# Patient Record
Sex: FEMALE | Race: OTHER | Employment: OTHER | URBAN - METROPOLITAN AREA
[De-identification: names, ages, dates, MRNs, and addresses within clinical notes are randomized per-mention and may not be internally consistent; named-entity substitution may affect disease eponyms.]

---

## 2017-01-25 ENCOUNTER — OFFICE VISIT (OUTPATIENT)
Dept: PRIMARY CARE CLINIC | Age: 72
End: 2017-01-25

## 2017-01-25 VITALS
RESPIRATION RATE: 14 BRPM | BODY MASS INDEX: 24.41 KG/M2 | DIASTOLIC BLOOD PRESSURE: 76 MMHG | WEIGHT: 143 LBS | SYSTOLIC BLOOD PRESSURE: 120 MMHG | OXYGEN SATURATION: 95 % | HEART RATE: 95 BPM | HEIGHT: 64 IN | TEMPERATURE: 98.1 F

## 2017-01-25 DIAGNOSIS — J02.9 SORE THROAT: Primary | ICD-10-CM

## 2017-01-25 DIAGNOSIS — E55.9 VITAMIN D DEFICIENCY: ICD-10-CM

## 2017-01-25 DIAGNOSIS — E03.4 HYPOTHYROIDISM DUE TO ACQUIRED ATROPHY OF THYROID: ICD-10-CM

## 2017-01-25 DIAGNOSIS — E78.2 MIXED HYPERLIPIDEMIA: ICD-10-CM

## 2017-01-25 DIAGNOSIS — J30.2 SEASONAL ALLERGIC RHINITIS, UNSPECIFIED ALLERGIC RHINITIS TRIGGER: ICD-10-CM

## 2017-01-25 DIAGNOSIS — J06.9 ACUTE UPPER RESPIRATORY INFECTION: ICD-10-CM

## 2017-01-25 LAB
ALBUMIN SERPL-MCNC: 4.8 G/DL (ref 3.9–4.9)
ALP BLD-CCNC: 71 U/L (ref 40–130)
ALT SERPL-CCNC: 14 U/L (ref 0–33)
ANION GAP SERPL CALCULATED.3IONS-SCNC: 16 MEQ/L (ref 7–13)
AST SERPL-CCNC: 19 U/L (ref 0–35)
BILIRUB SERPL-MCNC: 0.3 MG/DL (ref 0–1.2)
BUN BLDV-MCNC: 19 MG/DL (ref 8–23)
CALCIUM SERPL-MCNC: 10.4 MG/DL (ref 8.6–10.2)
CHLORIDE BLD-SCNC: 100 MEQ/L (ref 98–107)
CHOLESTEROL, TOTAL: 229 MG/DL (ref 0–199)
CO2: 25 MEQ/L (ref 22–29)
CREAT SERPL-MCNC: 0.68 MG/DL (ref 0.5–0.9)
GFR AFRICAN AMERICAN: >60
GFR NON-AFRICAN AMERICAN: >60
GLOBULIN: 3 G/DL (ref 2.3–3.5)
GLUCOSE BLD-MCNC: 106 MG/DL (ref 74–109)
HDLC SERPL-MCNC: 59 MG/DL (ref 40–59)
LDL CHOLESTEROL CALCULATED: 123 MG/DL (ref 0–129)
POTASSIUM SERPL-SCNC: 4.4 MEQ/L (ref 3.5–5.1)
S PYO AG THROAT QL: NORMAL
SODIUM BLD-SCNC: 141 MEQ/L (ref 132–144)
T4 FREE: 1.39 NG/DL (ref 0.93–1.7)
TOTAL PROTEIN: 7.8 G/DL (ref 6.4–8.1)
TRIGL SERPL-MCNC: 236 MG/DL (ref 0–200)
TSH SERPL DL<=0.05 MIU/L-ACNC: 0.5 UIU/ML (ref 0.27–4.2)
VITAMIN D 25-HYDROXY: 29.4 NG/ML (ref 30–100)

## 2017-01-25 PROCEDURE — 99213 OFFICE O/P EST LOW 20 MIN: CPT | Performed by: FAMILY MEDICINE

## 2017-01-25 PROCEDURE — 87880 STREP A ASSAY W/OPTIC: CPT | Performed by: FAMILY MEDICINE

## 2017-01-25 RX ORDER — TRIAMCINOLONE ACETONIDE 40 MG/ML
80 INJECTION, SUSPENSION INTRA-ARTICULAR; INTRAMUSCULAR ONCE
Status: COMPLETED | OUTPATIENT
Start: 2017-01-25 | End: 2017-01-25

## 2017-01-25 RX ORDER — AZITHROMYCIN 250 MG/1
TABLET, FILM COATED ORAL
Qty: 6 TABLET | Refills: 1 | Status: SHIPPED | OUTPATIENT
Start: 2017-01-25 | End: 2017-07-22

## 2017-01-25 RX ADMIN — TRIAMCINOLONE ACETONIDE 80 MG: 40 INJECTION, SUSPENSION INTRA-ARTICULAR; INTRAMUSCULAR at 10:13

## 2017-01-25 ASSESSMENT — ENCOUNTER SYMPTOMS
SINUS PRESSURE: 1
SHORTNESS OF BREATH: 1
COUGH: 1
SORE THROAT: 1
RHINORRHEA: 1

## 2017-01-26 DIAGNOSIS — E03.9 HYPOTHYROIDISM: ICD-10-CM

## 2017-01-26 DIAGNOSIS — J30.9 ALLERGIC RHINITIS: ICD-10-CM

## 2017-01-26 RX ORDER — LEVOTHYROXINE SODIUM 0.12 MG/1
TABLET ORAL
Qty: 90 TABLET | Refills: 0 | Status: SHIPPED | OUTPATIENT
Start: 2017-01-26 | End: 2017-04-24 | Stop reason: SDUPTHER

## 2017-01-26 RX ORDER — MONTELUKAST SODIUM 10 MG/1
TABLET ORAL
Qty: 90 TABLET | Refills: 0 | Status: SHIPPED | OUTPATIENT
Start: 2017-01-26 | End: 2017-04-24 | Stop reason: SDUPTHER

## 2017-03-31 ENCOUNTER — TELEPHONE (OUTPATIENT)
Dept: PRIMARY CARE CLINIC | Age: 72
End: 2017-03-31

## 2017-04-24 DIAGNOSIS — J30.9 ALLERGIC RHINITIS: ICD-10-CM

## 2017-04-24 DIAGNOSIS — E03.9 HYPOTHYROIDISM: ICD-10-CM

## 2017-04-24 RX ORDER — LEVOTHYROXINE SODIUM 0.12 MG/1
TABLET ORAL
Qty: 90 TABLET | Refills: 1 | Status: SHIPPED | OUTPATIENT
Start: 2017-04-24 | End: 2017-10-24 | Stop reason: SDUPTHER

## 2017-04-24 RX ORDER — MONTELUKAST SODIUM 10 MG/1
TABLET ORAL
Qty: 90 TABLET | Refills: 1 | Status: SHIPPED | OUTPATIENT
Start: 2017-04-24 | End: 2017-10-24 | Stop reason: SDUPTHER

## 2017-07-21 ENCOUNTER — TELEPHONE (OUTPATIENT)
Dept: PRIMARY CARE CLINIC | Age: 72
End: 2017-07-21

## 2017-07-22 RX ORDER — ATORVASTATIN CALCIUM 40 MG/1
40 TABLET, FILM COATED ORAL DAILY
Qty: 90 TABLET | Refills: 1 | Status: SHIPPED | OUTPATIENT
Start: 2017-07-22 | End: 2017-12-26 | Stop reason: SDUPTHER

## 2017-07-27 RX ORDER — MELOXICAM 7.5 MG/1
TABLET ORAL
Qty: 90 TABLET | Refills: 1 | Status: SHIPPED | OUTPATIENT
Start: 2017-07-27 | End: 2018-06-22 | Stop reason: SDUPTHER

## 2017-08-02 ENCOUNTER — TELEPHONE (OUTPATIENT)
Dept: PRIMARY CARE CLINIC | Age: 72
End: 2017-08-02

## 2017-08-02 DIAGNOSIS — Z00.00 ANNUAL PHYSICAL EXAM: Primary | ICD-10-CM

## 2017-08-02 DIAGNOSIS — E55.9 VITAMIN D DEFICIENCY: ICD-10-CM

## 2017-09-13 DIAGNOSIS — Z00.00 ANNUAL PHYSICAL EXAM: ICD-10-CM

## 2017-09-13 DIAGNOSIS — E55.9 VITAMIN D DEFICIENCY: ICD-10-CM

## 2017-09-13 LAB
ALBUMIN SERPL-MCNC: 4.6 G/DL (ref 3.9–4.9)
ALP BLD-CCNC: 74 U/L (ref 40–130)
ALT SERPL-CCNC: 27 U/L (ref 0–33)
ANION GAP SERPL CALCULATED.3IONS-SCNC: 18 MEQ/L (ref 7–13)
AST SERPL-CCNC: 31 U/L (ref 0–35)
BASOPHILS ABSOLUTE: 0.1 K/UL (ref 0–0.2)
BASOPHILS RELATIVE PERCENT: 0.9 %
BILIRUB SERPL-MCNC: 0.5 MG/DL (ref 0–1.2)
BUN BLDV-MCNC: 23 MG/DL (ref 8–23)
CALCIUM SERPL-MCNC: 10.1 MG/DL (ref 8.6–10.2)
CHLORIDE BLD-SCNC: 99 MEQ/L (ref 98–107)
CHOLESTEROL, TOTAL: 194 MG/DL (ref 0–199)
CO2: 26 MEQ/L (ref 22–29)
CREAT SERPL-MCNC: 0.89 MG/DL (ref 0.5–0.9)
EOSINOPHILS ABSOLUTE: 0.5 K/UL (ref 0–0.7)
EOSINOPHILS RELATIVE PERCENT: 5.1 %
GFR AFRICAN AMERICAN: >60
GFR NON-AFRICAN AMERICAN: >60
GLOBULIN: 3.4 G/DL (ref 2.3–3.5)
GLUCOSE BLD-MCNC: 96 MG/DL (ref 74–109)
HCT VFR BLD CALC: 37.4 % (ref 37–47)
HDLC SERPL-MCNC: 62 MG/DL (ref 40–59)
HEMOGLOBIN: 12 G/DL (ref 12–16)
LDL CHOLESTEROL CALCULATED: 93 MG/DL (ref 0–129)
LYMPHOCYTES ABSOLUTE: 2.1 K/UL (ref 1–4.8)
LYMPHOCYTES RELATIVE PERCENT: 22.1 %
MCH RBC QN AUTO: 27.4 PG (ref 27–31.3)
MCHC RBC AUTO-ENTMCNC: 32.1 % (ref 33–37)
MCV RBC AUTO: 85.5 FL (ref 82–100)
MONOCYTES ABSOLUTE: 0.9 K/UL (ref 0.2–0.8)
MONOCYTES RELATIVE PERCENT: 9.3 %
NEUTROPHILS ABSOLUTE: 6 K/UL (ref 1.4–6.5)
NEUTROPHILS RELATIVE PERCENT: 62.6 %
PDW BLD-RTO: 13.5 % (ref 11.5–14.5)
PLATELET # BLD: 279 K/UL (ref 130–400)
POTASSIUM SERPL-SCNC: 4.7 MEQ/L (ref 3.5–5.1)
RBC # BLD: 4.37 M/UL (ref 4.2–5.4)
SODIUM BLD-SCNC: 143 MEQ/L (ref 132–144)
T4 FREE: 1.33 NG/DL (ref 0.93–1.7)
TOTAL PROTEIN: 8 G/DL (ref 6.4–8.1)
TRIGL SERPL-MCNC: 196 MG/DL (ref 0–200)
TSH SERPL DL<=0.05 MIU/L-ACNC: 0.32 UIU/ML (ref 0.27–4.2)
VITAMIN D 25-HYDROXY: 37.6 NG/ML (ref 30–100)
WBC # BLD: 9.5 K/UL (ref 4.8–10.8)

## 2017-10-24 ENCOUNTER — OFFICE VISIT (OUTPATIENT)
Dept: PRIMARY CARE CLINIC | Age: 72
End: 2017-10-24

## 2017-10-24 VITALS
TEMPERATURE: 97.9 F | BODY MASS INDEX: 26.4 KG/M2 | DIASTOLIC BLOOD PRESSURE: 70 MMHG | RESPIRATION RATE: 14 BRPM | HEART RATE: 60 BPM | WEIGHT: 149 LBS | SYSTOLIC BLOOD PRESSURE: 120 MMHG | HEIGHT: 63 IN

## 2017-10-24 DIAGNOSIS — E03.9 ACQUIRED HYPOTHYROIDISM: Primary | ICD-10-CM

## 2017-10-24 DIAGNOSIS — J30.2 SEASONAL ALLERGIC RHINITIS, UNSPECIFIED CHRONICITY, UNSPECIFIED TRIGGER: ICD-10-CM

## 2017-10-24 DIAGNOSIS — Z23 NEED FOR PNEUMOCOCCAL VACCINATION: ICD-10-CM

## 2017-10-24 DIAGNOSIS — Z23 NEED FOR INFLUENZA VACCINATION: ICD-10-CM

## 2017-10-24 PROCEDURE — 90662 IIV NO PRSV INCREASED AG IM: CPT | Performed by: FAMILY MEDICINE

## 2017-10-24 PROCEDURE — 90670 PCV13 VACCINE IM: CPT | Performed by: FAMILY MEDICINE

## 2017-10-24 PROCEDURE — G0008 ADMIN INFLUENZA VIRUS VAC: HCPCS | Performed by: FAMILY MEDICINE

## 2017-10-24 PROCEDURE — G0009 ADMIN PNEUMOCOCCAL VACCINE: HCPCS | Performed by: FAMILY MEDICINE

## 2017-10-24 PROCEDURE — 99213 OFFICE O/P EST LOW 20 MIN: CPT | Performed by: FAMILY MEDICINE

## 2017-10-24 RX ORDER — TRIAMCINOLONE ACETONIDE 40 MG/ML
80 INJECTION, SUSPENSION INTRA-ARTICULAR; INTRAMUSCULAR ONCE
Status: COMPLETED | OUTPATIENT
Start: 2017-10-24 | End: 2017-10-24

## 2017-10-24 RX ORDER — MONTELUKAST SODIUM 10 MG/1
TABLET ORAL
Qty: 90 TABLET | Refills: 1 | Status: SHIPPED | OUTPATIENT
Start: 2017-10-24 | End: 2018-04-22 | Stop reason: SDUPTHER

## 2017-10-24 RX ORDER — LEVOTHYROXINE SODIUM 0.12 MG/1
TABLET ORAL
Qty: 90 TABLET | Refills: 1 | Status: SHIPPED | OUTPATIENT
Start: 2017-10-24 | End: 2018-04-22 | Stop reason: SDUPTHER

## 2017-10-24 RX ADMIN — TRIAMCINOLONE ACETONIDE 80 MG: 40 INJECTION, SUSPENSION INTRA-ARTICULAR; INTRAMUSCULAR at 12:21

## 2017-10-24 ASSESSMENT — PATIENT HEALTH QUESTIONNAIRE - PHQ9
SUM OF ALL RESPONSES TO PHQ9 QUESTIONS 1 & 2: 0
2. FEELING DOWN, DEPRESSED OR HOPELESS: 0
SUM OF ALL RESPONSES TO PHQ QUESTIONS 1-9: 0
1. LITTLE INTEREST OR PLEASURE IN DOING THINGS: 0

## 2017-10-24 ASSESSMENT — ENCOUNTER SYMPTOMS
EYE REDNESS: 0
PHOTOPHOBIA: 0
EYES NEGATIVE: 1
BACK PAIN: 0
DIARRHEA: 0
WHEEZING: 0
RESPIRATORY NEGATIVE: 1
SHORTNESS OF BREATH: 0
GASTROINTESTINAL NEGATIVE: 1
ABDOMINAL PAIN: 0
EYE ITCHING: 0
CONSTIPATION: 0

## 2017-10-24 NOTE — PROGRESS NOTES
Subjective:      Patient ID: Xochitl Brian is a 67 y.o. female who presents today for:  Chief Complaint   Patient presents with    Allergies     pt is requesting an allergy injection last injection 1/2017    Hypothyroidism     follow up pt denies fatigue,weight gain or loss,hair loss or dry skin       HPI     Patient is here for a follow up on her thyroids. Patient denies any chest pain, palpitations, shortness of breath, dizziness, lightheadedness, weakness. Past Medical History:   Diagnosis Date    Allergic rhinitis     Ankle dislocation     (L) FX    Hypercholesterolemia     Hypothyroidism      Past Surgical History:   Procedure Laterality Date    HYSTERECTOMY      PARTIAL    THYROID SURGERY      PARTIAL REMOVAL     Family History   Problem Relation Age of Onset    High Blood Pressure Mother      Social History     Social History    Marital status:      Spouse name: N/A    Number of children: N/A    Years of education: N/A     Occupational History    Not on file. Social History Main Topics    Smoking status: Never Smoker    Smokeless tobacco: Never Used    Alcohol use No    Drug use: No    Sexual activity: Not on file     Other Topics Concern    Not on file     Social History Narrative    No narrative on file     Allergies:  Review of patient's allergies indicates no known allergies. Review of Systems   Constitutional: Negative. Negative for activity change, appetite change and fatigue. HENT: Negative. Eyes: Negative. Negative for photophobia, redness and itching. Respiratory: Negative. Negative for shortness of breath and wheezing. Cardiovascular: Negative. Gastrointestinal: Negative. Negative for abdominal pain, constipation and diarrhea. Genitourinary: Negative. Negative for hematuria, pelvic pain and urgency. Musculoskeletal: Negative. Negative for back pain. Skin: Negative. Neurological: Negative. Psychiatric/Behavioral: Negative.   Negative for agitation and behavioral problems. The patient is not nervous/anxious. Objective:   /70 (Site: Left Arm, Position: Sitting, Cuff Size: Medium Adult)   Pulse 60   Temp 97.9 °F (36.6 °C) (Tympanic)   Resp 14   Ht 5' 3\" (1.6 m)   Wt 149 lb (67.6 kg)   BMI 26.39 kg/m²     Physical Exam   Constitutional: She is oriented to person, place, and time. She appears well-developed and well-nourished. HENT:   Head: Normocephalic and atraumatic. Nose: Mucosal edema and rhinorrhea present. No sinus tenderness. No epistaxis. Eyes: Conjunctivae and EOM are normal. Pupils are equal, round, and reactive to light. Neck: Normal range of motion. Neck supple. No thyromegaly present. Cardiovascular: Normal rate, regular rhythm and normal heart sounds. No murmur heard. Pulmonary/Chest: Effort normal and breath sounds normal. She has no wheezes. She exhibits no tenderness. Abdominal: Soft. Bowel sounds are normal. There is no tenderness. Musculoskeletal: Normal range of motion. She exhibits no edema or tenderness. Lymphadenopathy:     She has no cervical adenopathy. Neurological: She is alert and oriented to person, place, and time. She has normal reflexes. Coordination normal.   Skin: Skin is warm and dry. No rash noted. Psychiatric: She has a normal mood and affect. Thought content normal.   Nursing note and vitals reviewed. Assessment:     1. Acquired hypothyroidism  levothyroxine (SYNTHROID) 125 MCG tablet   2. Seasonal allergic rhinitis, unspecified chronicity, unspecified trigger  triamcinolone acetonide (KENALOG-40) injection 80 mg    montelukast (SINGULAIR) 10 MG tablet   3. Need for influenza vaccination  INFLUENZA, HIGH DOSE, 65 YRS +, IM, PF, PREFILL SYR, 0.5ML (FLUZONE HD)   4.  Need for pneumococcal vaccination  Pneumococcal conjugate vaccine 13-valent IM (PREVNAR 13)       Plan:      Orders Placed This Encounter   Procedures    INFLUENZA, HIGH DOSE, 65 YRS +, IM, PF, PREFILL SYR, 0.5ML (FLUZONE HD)    Pneumococcal conjugate vaccine 13-valent IM (PREVNAR 13)     Orders Placed This Encounter   Medications    triamcinolone acetonide (KENALOG-40) injection 80 mg    levothyroxine (SYNTHROID) 125 MCG tablet     Sig: TAKE 1 TABLET DAILY     Dispense:  90 tablet     Refill:  1    montelukast (SINGULAIR) 10 MG tablet     Sig: TAKE 1 TABLET DAILY     Dispense:  90 tablet     Refill:  1       Controlled Substances Monitoring:      Return in about 6 months (around 4/24/2018) for Review of Labs & Diagnostic Testing, Routine follow up. Lynette SIMONS ([facility]), am scribing for and in the presence of Shelbie Rodas DO. Electronically signed by :  Joshua Marshall, [address] [place] [address] ([address] [place] [address])      I, Shelbie Rodas DO, personally performed the services described in this documentation, as scribed by Joshua Marshall in my presence, and it is both accurate and complete.  Electronically signed by: Shelbie Rodas DO    10/24/17 10:28 PM    Shelbie Rodas DO

## 2017-11-21 RX ORDER — NITROGLYCERIN 0.4 MG/1
0.4 TABLET SUBLINGUAL EVERY 5 MIN PRN
Qty: 25 TABLET | Refills: 3 | Status: SHIPPED | OUTPATIENT
Start: 2017-11-21

## 2017-12-26 RX ORDER — ATORVASTATIN CALCIUM 40 MG/1
TABLET, FILM COATED ORAL
Qty: 90 TABLET | Refills: 1 | Status: SHIPPED | OUTPATIENT
Start: 2017-12-26

## 2018-04-22 DIAGNOSIS — E03.9 ACQUIRED HYPOTHYROIDISM: ICD-10-CM

## 2018-04-22 DIAGNOSIS — J30.2 SEASONAL ALLERGIC RHINITIS, UNSPECIFIED CHRONICITY, UNSPECIFIED TRIGGER: ICD-10-CM

## 2018-04-22 RX ORDER — MONTELUKAST SODIUM 10 MG/1
TABLET ORAL
Qty: 90 TABLET | Refills: 1 | Status: SHIPPED | OUTPATIENT
Start: 2018-04-22 | End: 2018-10-19 | Stop reason: SDUPTHER

## 2018-04-22 RX ORDER — LEVOTHYROXINE SODIUM 0.12 MG/1
TABLET ORAL
Qty: 90 TABLET | Refills: 1 | Status: SHIPPED | OUTPATIENT
Start: 2018-04-22 | End: 2019-01-17 | Stop reason: SDUPTHER

## 2018-06-20 RX ORDER — MELOXICAM 7.5 MG/1
TABLET ORAL
Qty: 90 TABLET | Refills: 1 | OUTPATIENT
Start: 2018-06-20

## 2018-06-22 ENCOUNTER — OFFICE VISIT (OUTPATIENT)
Dept: PRIMARY CARE CLINIC | Age: 73
End: 2018-06-22
Payer: MEDICARE

## 2018-06-22 VITALS
HEIGHT: 63 IN | HEART RATE: 75 BPM | SYSTOLIC BLOOD PRESSURE: 126 MMHG | WEIGHT: 149.3 LBS | DIASTOLIC BLOOD PRESSURE: 72 MMHG | OXYGEN SATURATION: 96 % | BODY MASS INDEX: 26.45 KG/M2 | TEMPERATURE: 98.1 F

## 2018-06-22 DIAGNOSIS — D51.9 ANEMIA DUE TO VITAMIN B12 DEFICIENCY, UNSPECIFIED B12 DEFICIENCY TYPE: ICD-10-CM

## 2018-06-22 DIAGNOSIS — E78.5 DYSLIPIDEMIA: ICD-10-CM

## 2018-06-22 DIAGNOSIS — J30.9 ACUTE ALLERGIC RHINITIS, UNSPECIFIED SEASONALITY, UNSPECIFIED TRIGGER: ICD-10-CM

## 2018-06-22 DIAGNOSIS — E55.9 VITAMIN D DEFICIENCY: ICD-10-CM

## 2018-06-22 DIAGNOSIS — Z00.00 PREVENTATIVE HEALTH CARE: ICD-10-CM

## 2018-06-22 DIAGNOSIS — E03.4 HYPOTHYROIDISM DUE TO ACQUIRED ATROPHY OF THYROID: Primary | ICD-10-CM

## 2018-06-22 DIAGNOSIS — E03.4 HYPOTHYROIDISM DUE TO ACQUIRED ATROPHY OF THYROID: ICD-10-CM

## 2018-06-22 LAB
ALBUMIN SERPL-MCNC: 4.8 G/DL (ref 3.9–4.9)
ALP BLD-CCNC: 80 U/L (ref 40–130)
ALT SERPL-CCNC: 22 U/L (ref 0–33)
ANION GAP SERPL CALCULATED.3IONS-SCNC: 17 MEQ/L (ref 7–13)
AST SERPL-CCNC: 40 U/L (ref 0–35)
BASOPHILS ABSOLUTE: 0.1 K/UL (ref 0–0.2)
BASOPHILS RELATIVE PERCENT: 0.6 %
BILIRUB SERPL-MCNC: 0.5 MG/DL (ref 0–1.2)
BUN BLDV-MCNC: 26 MG/DL (ref 8–23)
CALCIUM SERPL-MCNC: 10.2 MG/DL (ref 8.6–10.2)
CHLORIDE BLD-SCNC: 100 MEQ/L (ref 98–107)
CHOLESTEROL, TOTAL: 206 MG/DL (ref 0–199)
CO2: 23 MEQ/L (ref 22–29)
CREAT SERPL-MCNC: 0.91 MG/DL (ref 0.5–0.9)
EOSINOPHILS ABSOLUTE: 0.6 K/UL (ref 0–0.7)
EOSINOPHILS RELATIVE PERCENT: 6.6 %
GFR AFRICAN AMERICAN: >60
GFR NON-AFRICAN AMERICAN: >60
GLOBULIN: 3.8 G/DL (ref 2.3–3.5)
GLUCOSE BLD-MCNC: 95 MG/DL (ref 74–109)
HCT VFR BLD CALC: 38.2 % (ref 37–47)
HDLC SERPL-MCNC: 53 MG/DL (ref 40–59)
HEMOGLOBIN: 12.4 G/DL (ref 12–16)
LDL CHOLESTEROL CALCULATED: 126 MG/DL (ref 0–129)
LYMPHOCYTES ABSOLUTE: 2.5 K/UL (ref 1–4.8)
LYMPHOCYTES RELATIVE PERCENT: 28.7 %
MCH RBC QN AUTO: 28.6 PG (ref 27–31.3)
MCHC RBC AUTO-ENTMCNC: 32.5 % (ref 33–37)
MCV RBC AUTO: 87.9 FL (ref 82–100)
MONOCYTES ABSOLUTE: 0.8 K/UL (ref 0.2–0.8)
MONOCYTES RELATIVE PERCENT: 9.1 %
NEUTROPHILS ABSOLUTE: 4.8 K/UL (ref 1.4–6.5)
NEUTROPHILS RELATIVE PERCENT: 55 %
PDW BLD-RTO: 13.6 % (ref 11.5–14.5)
PLATELET # BLD: 297 K/UL (ref 130–400)
POTASSIUM SERPL-SCNC: 4.5 MEQ/L (ref 3.5–5.1)
RBC # BLD: 4.35 M/UL (ref 4.2–5.4)
SODIUM BLD-SCNC: 140 MEQ/L (ref 132–144)
TOTAL PROTEIN: 8.6 G/DL (ref 6.4–8.1)
TRIGL SERPL-MCNC: 134 MG/DL (ref 0–200)
TSH SERPL DL<=0.05 MIU/L-ACNC: 0.3 UIU/ML (ref 0.27–4.2)
VITAMIN D 25-HYDROXY: 39.3 NG/ML (ref 30–100)
WBC # BLD: 8.7 K/UL (ref 4.8–10.8)

## 2018-06-22 PROCEDURE — 99213 OFFICE O/P EST LOW 20 MIN: CPT | Performed by: FAMILY MEDICINE

## 2018-06-22 PROCEDURE — 96372 THER/PROPH/DIAG INJ SC/IM: CPT | Performed by: FAMILY MEDICINE

## 2018-06-22 RX ORDER — TRIAMCINOLONE ACETONIDE 40 MG/ML
80 INJECTION, SUSPENSION INTRA-ARTICULAR; INTRAMUSCULAR ONCE
Status: COMPLETED | OUTPATIENT
Start: 2018-06-22 | End: 2018-06-22

## 2018-06-22 RX ORDER — MELOXICAM 7.5 MG/1
TABLET ORAL
Qty: 90 TABLET | Refills: 1 | Status: SHIPPED | OUTPATIENT
Start: 2018-06-22

## 2018-06-22 RX ADMIN — TRIAMCINOLONE ACETONIDE 80 MG: 40 INJECTION, SUSPENSION INTRA-ARTICULAR; INTRAMUSCULAR at 11:08

## 2018-06-22 ASSESSMENT — ENCOUNTER SYMPTOMS
RESPIRATORY NEGATIVE: 1
GASTROINTESTINAL NEGATIVE: 1
SHORTNESS OF BREATH: 0
WHEEZING: 0
EYE ITCHING: 0
EYE REDNESS: 0
BACK PAIN: 0
ABDOMINAL PAIN: 0
EYES NEGATIVE: 1
CONSTIPATION: 0
PHOTOPHOBIA: 0
DIARRHEA: 0

## 2018-06-26 DIAGNOSIS — Z00.00 PREVENTATIVE HEALTH CARE: ICD-10-CM

## 2018-06-26 LAB
CONTROL: POSITIVE
HEMOCCULT STL QL: NEGATIVE

## 2018-06-26 PROCEDURE — 82274 ASSAY TEST FOR BLOOD FECAL: CPT | Performed by: FAMILY MEDICINE

## 2018-10-19 DIAGNOSIS — J30.2 SEASONAL ALLERGIC RHINITIS: ICD-10-CM

## 2018-10-19 RX ORDER — MONTELUKAST SODIUM 10 MG/1
TABLET ORAL
Qty: 90 TABLET | Refills: 1 | Status: SHIPPED | OUTPATIENT
Start: 2018-10-19

## 2019-01-11 DIAGNOSIS — E03.9 ACQUIRED HYPOTHYROIDISM: ICD-10-CM

## 2019-01-11 RX ORDER — LEVOTHYROXINE SODIUM 0.12 MG/1
TABLET ORAL
Qty: 90 TABLET | Refills: 1 | OUTPATIENT
Start: 2019-01-11

## 2019-01-17 ENCOUNTER — OFFICE VISIT (OUTPATIENT)
Dept: PRIMARY CARE CLINIC | Age: 74
End: 2019-01-17
Payer: MEDICARE

## 2019-01-17 VITALS
BODY MASS INDEX: 25.23 KG/M2 | HEIGHT: 64 IN | DIASTOLIC BLOOD PRESSURE: 64 MMHG | HEART RATE: 62 BPM | TEMPERATURE: 97 F | OXYGEN SATURATION: 98 % | SYSTOLIC BLOOD PRESSURE: 112 MMHG | WEIGHT: 147.8 LBS

## 2019-01-17 DIAGNOSIS — E03.9 ACQUIRED HYPOTHYROIDISM: Primary | ICD-10-CM

## 2019-01-17 DIAGNOSIS — J30.9 ALLERGIC RHINITIS, UNSPECIFIED SEASONALITY, UNSPECIFIED TRIGGER: ICD-10-CM

## 2019-01-17 DIAGNOSIS — E78.00 HYPERCHOLESTEROLEMIA: ICD-10-CM

## 2019-01-17 DIAGNOSIS — E03.9 ACQUIRED HYPOTHYROIDISM: ICD-10-CM

## 2019-01-17 DIAGNOSIS — Z23 NEED FOR VACCINATION AGAINST STREPTOCOCCUS PNEUMONIAE: ICD-10-CM

## 2019-01-17 LAB
ALBUMIN SERPL-MCNC: 4.6 G/DL (ref 3.9–4.9)
ALP BLD-CCNC: 85 U/L (ref 40–130)
ALT SERPL-CCNC: 21 U/L (ref 0–33)
ANION GAP SERPL CALCULATED.3IONS-SCNC: 15 MEQ/L (ref 7–13)
AST SERPL-CCNC: 31 U/L (ref 0–35)
BILIRUB SERPL-MCNC: 0.4 MG/DL (ref 0–1.2)
BUN BLDV-MCNC: 21 MG/DL (ref 8–23)
CALCIUM SERPL-MCNC: 10.5 MG/DL (ref 8.6–10.2)
CHLORIDE BLD-SCNC: 101 MEQ/L (ref 98–107)
CHOLESTEROL, TOTAL: 210 MG/DL (ref 0–199)
CO2: 26 MEQ/L (ref 22–29)
CREAT SERPL-MCNC: 0.99 MG/DL (ref 0.5–0.9)
GFR AFRICAN AMERICAN: >60
GFR NON-AFRICAN AMERICAN: 54.7
GLOBULIN: 3.5 G/DL (ref 2.3–3.5)
GLUCOSE BLD-MCNC: 92 MG/DL (ref 74–109)
HDLC SERPL-MCNC: 51 MG/DL (ref 40–59)
LDL CHOLESTEROL CALCULATED: 114 MG/DL (ref 0–129)
POTASSIUM SERPL-SCNC: 4.9 MEQ/L (ref 3.5–5.1)
SODIUM BLD-SCNC: 142 MEQ/L (ref 132–144)
T4 FREE: 1.31 NG/DL (ref 0.93–1.7)
TOTAL PROTEIN: 8.1 G/DL (ref 6.4–8.1)
TRIGL SERPL-MCNC: 227 MG/DL (ref 0–200)
TSH SERPL DL<=0.05 MIU/L-ACNC: 0.49 UIU/ML (ref 0.27–4.2)

## 2019-01-17 PROCEDURE — 99213 OFFICE O/P EST LOW 20 MIN: CPT | Performed by: FAMILY MEDICINE

## 2019-01-17 PROCEDURE — 90732 PPSV23 VACC 2 YRS+ SUBQ/IM: CPT | Performed by: FAMILY MEDICINE

## 2019-01-17 PROCEDURE — G0009 ADMIN PNEUMOCOCCAL VACCINE: HCPCS | Performed by: FAMILY MEDICINE

## 2019-01-17 RX ORDER — LEVOTHYROXINE SODIUM 0.12 MG/1
TABLET ORAL
Qty: 90 TABLET | Refills: 1 | Status: SHIPPED | OUTPATIENT
Start: 2019-01-17

## 2019-01-17 ASSESSMENT — PATIENT HEALTH QUESTIONNAIRE - PHQ9
SUM OF ALL RESPONSES TO PHQ9 QUESTIONS 1 & 2: 0
SUM OF ALL RESPONSES TO PHQ QUESTIONS 1-9: 0
1. LITTLE INTEREST OR PLEASURE IN DOING THINGS: 0
SUM OF ALL RESPONSES TO PHQ QUESTIONS 1-9: 0
2. FEELING DOWN, DEPRESSED OR HOPELESS: 0

## 2019-01-20 ASSESSMENT — ENCOUNTER SYMPTOMS
BACK PAIN: 0
DIARRHEA: 0
CONSTIPATION: 0
ABDOMINAL PAIN: 0
GASTROINTESTINAL NEGATIVE: 1
EYE ITCHING: 0
RESPIRATORY NEGATIVE: 1
WHEEZING: 0
EYES NEGATIVE: 1
PHOTOPHOBIA: 0
EYE REDNESS: 0
SHORTNESS OF BREATH: 0

## 2019-01-24 ENCOUNTER — TELEPHONE (OUTPATIENT)
Dept: PRIMARY CARE CLINIC | Age: 74
End: 2019-01-24

## 2019-02-20 ENCOUNTER — TELEPHONE (OUTPATIENT)
Dept: FAMILY MEDICINE CLINIC | Age: 74
End: 2019-02-20

## 2023-02-27 LAB
ALANINE AMINOTRANSFERASE (SGPT) (U/L) IN SER/PLAS: 23 U/L (ref 7–45)
ALBUMIN (G/DL) IN SER/PLAS: 4.5 G/DL (ref 3.4–5)
ALKALINE PHOSPHATASE (U/L) IN SER/PLAS: 72 U/L (ref 33–136)
ANION GAP IN SER/PLAS: 10 MMOL/L (ref 10–20)
ASPARTATE AMINOTRANSFERASE (SGOT) (U/L) IN SER/PLAS: 27 U/L (ref 9–39)
BILIRUBIN TOTAL (MG/DL) IN SER/PLAS: 0.7 MG/DL (ref 0–1.2)
CALCIUM (MG/DL) IN SER/PLAS: 10.3 MG/DL (ref 8.6–10.3)
CARBON DIOXIDE, TOTAL (MMOL/L) IN SER/PLAS: 30 MMOL/L (ref 21–32)
CHLORIDE (MMOL/L) IN SER/PLAS: 106 MMOL/L (ref 98–107)
CHOLESTEROL (MG/DL) IN SER/PLAS: 140 MG/DL (ref 0–199)
CHOLESTEROL IN HDL (MG/DL) IN SER/PLAS: 44 MG/DL
CHOLESTEROL/HDL RATIO: 3.2
CREATININE (MG/DL) IN SER/PLAS: 1.09 MG/DL (ref 0.5–1.05)
GFR FEMALE: 52 ML/MIN/1.73M2
GLUCOSE (MG/DL) IN SER/PLAS: 98 MG/DL (ref 74–99)
LDL: 66 MG/DL (ref 0–99)
POTASSIUM (MMOL/L) IN SER/PLAS: 4.7 MMOL/L (ref 3.5–5.3)
PROTEIN TOTAL: 7.8 G/DL (ref 6.4–8.2)
SODIUM (MMOL/L) IN SER/PLAS: 141 MMOL/L (ref 136–145)
THYROTROPIN (MIU/L) IN SER/PLAS BY DETECTION LIMIT <= 0.05 MIU/L: 0.06 MIU/L (ref 0.44–3.98)
THYROXINE (T4) (UG/DL) IN SER/PLAS: 13 UG/DL (ref 4.5–11.1)
THYROXINE (T4) FREE INDEX IN SER/PLAS BY CALCULATION: 4.2 (ref 1.6–4.7)
TRIGLYCERIDE (MG/DL) IN SER/PLAS: 149 MG/DL (ref 0–149)
TRIIODOTHYRONINE RESIN UPTAKE (T3RU) % IN SER/PLAS: 32 % (ref 24–41)
UREA NITROGEN (MG/DL) IN SER/PLAS: 22 MG/DL (ref 6–23)
VLDL: 30 MG/DL (ref 0–40)

## 2023-03-11 DIAGNOSIS — M25.532 LEFT WRIST PAIN: Primary | ICD-10-CM

## 2023-03-13 PROBLEM — M25.532 LEFT WRIST PAIN: Status: ACTIVE | Noted: 2023-03-13

## 2023-03-13 RX ORDER — MELOXICAM 7.5 MG/1
TABLET ORAL
Qty: 90 TABLET | Refills: 0 | Status: SHIPPED | OUTPATIENT
Start: 2023-03-13

## 2023-04-01 DIAGNOSIS — J00 ACUTE RHINITIS: ICD-10-CM

## 2023-04-03 PROBLEM — J00 ACUTE RHINITIS: Status: ACTIVE | Noted: 2023-04-03

## 2023-04-03 RX ORDER — MONTELUKAST SODIUM 10 MG/1
TABLET ORAL
Qty: 90 TABLET | Refills: 1 | Status: SHIPPED | OUTPATIENT
Start: 2023-04-03 | End: 2023-09-21 | Stop reason: SDUPTHER

## 2023-07-12 ENCOUNTER — TELEPHONE (OUTPATIENT)
Dept: PRIMARY CARE | Facility: CLINIC | Age: 78
End: 2023-07-12
Payer: MEDICARE

## 2023-07-12 NOTE — TELEPHONE ENCOUNTER
Dr. Groves Pt.    Pt stated that she is starting to get sinus infection and would like to know if Dr. Groves could call in an antibiotic for her.    Pharmacy- [facility] in [address] [address]

## 2023-08-01 ENCOUNTER — OFFICE VISIT (OUTPATIENT)
Dept: PRIMARY CARE | Facility: CLINIC | Age: 78
End: 2023-08-01
Payer: MEDICARE

## 2023-08-01 VITALS
OXYGEN SATURATION: 98 % | HEIGHT: 64 IN | SYSTOLIC BLOOD PRESSURE: 130 MMHG | DIASTOLIC BLOOD PRESSURE: 74 MMHG | TEMPERATURE: 98.1 F | WEIGHT: 140 LBS | HEART RATE: 89 BPM | BODY MASS INDEX: 23.9 KG/M2

## 2023-08-01 DIAGNOSIS — Z71.89 ACP (ADVANCE CARE PLANNING): ICD-10-CM

## 2023-08-01 DIAGNOSIS — M85.80 OSTEOPENIA, UNSPECIFIED LOCATION: ICD-10-CM

## 2023-08-01 DIAGNOSIS — E03.9 HYPOTHYROIDISM, UNSPECIFIED TYPE: ICD-10-CM

## 2023-08-01 DIAGNOSIS — J01.80 OTHER ACUTE SINUSITIS, RECURRENCE NOT SPECIFIED: ICD-10-CM

## 2023-08-01 DIAGNOSIS — Z00.00 ENCOUNTER FOR MEDICARE ANNUAL WELLNESS EXAM: Primary | ICD-10-CM

## 2023-08-01 DIAGNOSIS — Z00.00 ROUTINE GENERAL MEDICAL EXAMINATION AT HEALTH CARE FACILITY: ICD-10-CM

## 2023-08-01 PROBLEM — I25.10 CAD (CORONARY ARTERY DISEASE): Status: ACTIVE | Noted: 2023-08-01

## 2023-08-01 PROBLEM — E05.90 HYPERTHYROIDISM: Status: ACTIVE | Noted: 2023-08-01

## 2023-08-01 PROBLEM — R53.83 FATIGUE: Status: ACTIVE | Noted: 2023-08-01

## 2023-08-01 PROBLEM — E78.00 HYPERCHOLESTEROLEMIA: Status: ACTIVE | Noted: 2023-08-01

## 2023-08-01 PROBLEM — E78.5 DYSLIPIDEMIA: Status: ACTIVE | Noted: 2023-08-01

## 2023-08-01 PROBLEM — E55.9 VITAMIN D DEFICIENCY: Status: ACTIVE | Noted: 2023-08-01

## 2023-08-01 PROCEDURE — 99212 OFFICE O/P EST SF 10 MIN: CPT | Performed by: FAMILY MEDICINE

## 2023-08-01 PROCEDURE — G0439 PPPS, SUBSEQ VISIT: HCPCS | Performed by: FAMILY MEDICINE

## 2023-08-01 RX ORDER — IBANDRONATE SODIUM 150 MG/1
150 TABLET, FILM COATED ORAL
Qty: 4 TABLET | Refills: 3 | Status: SHIPPED | OUTPATIENT
Start: 2023-08-01

## 2023-08-01 RX ORDER — AMOXICILLIN AND CLAVULANATE POTASSIUM 875; 125 MG/1; MG/1
875 TABLET, FILM COATED ORAL 2 TIMES DAILY
Qty: 20 TABLET | Refills: 0 | Status: SHIPPED | OUTPATIENT
Start: 2023-08-01 | End: 2023-08-01 | Stop reason: SDUPTHER

## 2023-08-01 RX ORDER — ASPIRIN 81 MG/1
1 TABLET ORAL DAILY
COMMUNITY

## 2023-08-01 RX ORDER — AMOXICILLIN AND CLAVULANATE POTASSIUM 875; 125 MG/1; MG/1
875 TABLET, FILM COATED ORAL 2 TIMES DAILY
Qty: 20 TABLET | Refills: 0 | Status: SHIPPED | OUTPATIENT
Start: 2023-08-01 | End: 2023-08-11

## 2023-08-01 RX ORDER — EZETIMIBE 10 MG/1
1 TABLET ORAL DAILY
COMMUNITY
Start: 2022-05-09 | End: 2024-02-06

## 2023-08-01 RX ORDER — LEVOTHYROXINE SODIUM 125 UG/1
TABLET ORAL
COMMUNITY
Start: 2019-01-17 | End: 2023-12-29

## 2023-08-01 RX ORDER — ATORVASTATIN CALCIUM 80 MG/1
1 TABLET, FILM COATED ORAL NIGHTLY
COMMUNITY
Start: 2022-04-27 | End: 2024-02-06

## 2023-08-01 RX ORDER — ATORVASTATIN CALCIUM 40 MG/1
80 TABLET, FILM COATED ORAL DAILY
COMMUNITY
Start: 2017-12-26 | End: 2023-08-01 | Stop reason: DRUGHIGH

## 2023-08-01 ASSESSMENT — ENCOUNTER SYMPTOMS
SWOLLEN GLANDS: 0
SINUS COMPLAINT: 1
NECK PAIN: 0
PALPITATIONS: 0
CONFUSION: 0
FLANK PAIN: 0
FATIGUE: 0
SPEECH DIFFICULTY: 0
SINUS PRESSURE: 0
DIARRHEA: 0
ARTHRALGIAS: 0
COLOR CHANGE: 0
SLEEP DISTURBANCE: 0
SEIZURES: 0
RHINORRHEA: 0
RECTAL PAIN: 0
CHEST TIGHTNESS: 0
AGITATION: 0
TROUBLE SWALLOWING: 0
MYALGIAS: 0
FEVER: 0
COUGH: 0
DYSURIA: 0
SHORTNESS OF BREATH: 0
ADENOPATHY: 0
DECREASED CONCENTRATION: 0
SINUS PAIN: 0
CONSTIPATION: 0
BLOOD IN STOOL: 0
DIZZINESS: 0
SORE THROAT: 0
DYSPHORIC MOOD: 0
NECK STIFFNESS: 0
CHILLS: 1
EYE PAIN: 0
PHOTOPHOBIA: 0
ABDOMINAL DISTENTION: 0
POLYDIPSIA: 0
HEMATURIA: 0
STRIDOR: 0
APPETITE CHANGE: 0
HEADACHES: 0
POLYPHAGIA: 0
NERVOUS/ANXIOUS: 0
DIAPHORESIS: 0
ACTIVITY CHANGE: 0
ABDOMINAL PAIN: 0
HOARSE VOICE: 0

## 2023-08-01 ASSESSMENT — PATIENT HEALTH QUESTIONNAIRE - PHQ9
2. FEELING DOWN, DEPRESSED OR HOPELESS: NOT AT ALL
1. LITTLE INTEREST OR PLEASURE IN DOING THINGS: NOT AT ALL
SUM OF ALL RESPONSES TO PHQ9 QUESTIONS 1 AND 2: 0

## 2023-08-01 ASSESSMENT — ACTIVITIES OF DAILY LIVING (ADL)
DRESSING: INDEPENDENT
BATHING: INDEPENDENT
MANAGING_FINANCES: INDEPENDENT
GROCERY_SHOPPING: INDEPENDENT
TAKING_MEDICATION: INDEPENDENT
DOING_HOUSEWORK: INDEPENDENT

## 2023-08-01 NOTE — PATIENT INSTRUCTIONS
Follow up in 4 months    Continue current medications and therapy for chronic medical conditions.    Patient was advised importance of proper diet/nutrition in addition adequate hydration. Patient was encouraged moderate exercise program to include 30 minutes daily for 5 days of the week or 150 minutes weekly. Patient will follow-up with us as scheduled.    Medicare annual wellness completed today     Bone density reviewed with patient     Start augmenting 875-125mg BID x 10 days     Start Boniva 150mg once monthly     T4 and TSH ordered

## 2023-08-01 NOTE — PROGRESS NOTES
Subjective   Reason for Visit: David Medina is an 78 y.o. female here for a Medicare Wellness visit.     Past Medical, Surgical, and Family History reviewed and updated in chart.         Patient got stung by a bee a couple of days ago, states the area is red and feels she has a fever.     Sinus Problem  This is a new problem. The current episode started 1 to 4 weeks ago. The problem has been gradually improving since onset. The maximum temperature recorded prior to her arrival was 100.4 - 100.9 F. Her pain is at a severity of 0/10. She is experiencing no pain. Associated symptoms include chills and congestion. Pertinent negatives include no coughing, diaphoresis, ear pain, headaches, hoarse voice, neck pain, shortness of breath, sinus pressure, sneezing, sore throat or swollen glands. Treatments tried: benadryl. The treatment provided no relief.       Patient Care Team:  Jimenez Groves DO as PCP - General  Jimenez Groves DO as PCP - [employer] PCP     Review of Systems   Constitutional:  Positive for chills. Negative for activity change, appetite change, diaphoresis, fatigue and fever.   HENT:  Positive for congestion. Negative for dental problem, ear discharge, ear pain, hoarse voice, mouth sores, rhinorrhea, sinus pressure, sinus pain, sneezing, sore throat, tinnitus and trouble swallowing.    Eyes:  Negative for photophobia, pain and visual disturbance.   Respiratory:  Negative for cough, chest tightness, shortness of breath and stridor.    Cardiovascular:  Negative for chest pain and palpitations.   Gastrointestinal:  Negative for abdominal distention, abdominal pain, blood in stool, constipation, diarrhea and rectal pain.   Endocrine: Negative for cold intolerance, heat intolerance, polydipsia, polyphagia and polyuria.   Genitourinary:  Negative for dysuria, flank pain, hematuria and urgency.   Musculoskeletal:  Negative for arthralgias, gait problem, myalgias, neck pain and neck  "stiffness.   Skin:  Negative for color change and rash.   Allergic/Immunologic: Negative for environmental allergies and food allergies.   Neurological:  Negative for dizziness, seizures, syncope, speech difficulty and headaches.   Hematological:  Negative for adenopathy.   Psychiatric/Behavioral:  Negative for agitation, confusion, decreased concentration, dysphoric mood and sleep disturbance. The patient is not nervous/anxious.        Objective   Vitals:  /74   Pulse 89   Temp 36.7 °C (98.1 °F)   Ht 1.626 m (5' 4\")   Wt 63.5 kg (140 lb)   SpO2 98%   BMI 24.03 kg/m²       Physical Exam  Vitals reviewed.   Constitutional:       General: She is not in acute distress.     Appearance: Normal appearance. She is normal weight. She is not ill-appearing or diaphoretic.   HENT:      Head: Normocephalic.      Right Ear: Tympanic membrane and external ear normal.      Left Ear: Tympanic membrane and external ear normal.      Nose: Nose normal. No congestion.      Mouth/Throat:      Pharynx: No posterior oropharyngeal erythema.   Eyes:      General:         Right eye: No discharge.         Left eye: No discharge.      Extraocular Movements: Extraocular movements intact.      Conjunctiva/sclera: Conjunctivae normal.      Pupils: Pupils are equal, round, and reactive to light.   Cardiovascular:      Rate and Rhythm: Normal rate and regular rhythm.      Pulses: Normal pulses.      Heart sounds: Normal heart sounds. No murmur heard.  Pulmonary:      Effort: Pulmonary effort is normal. No respiratory distress.      Breath sounds: Normal breath sounds. No wheezing or rales.   Chest:      Chest wall: No tenderness.   Abdominal:      General: Abdomen is flat. Bowel sounds are normal. There is no distension.      Palpations: There is no mass.      Tenderness: There is no abdominal tenderness. There is no guarding.   Musculoskeletal:         General: No tenderness. Normal range of motion.      Cervical back: Normal range of " motion and neck supple. No tenderness.      Right lower leg: No edema.      Left lower leg: No edema.   Skin:     General: Skin is warm and dry.      Coloration: Skin is not jaundiced.      Findings: No bruising or erythema.   Neurological:      General: No focal deficit present.      Mental Status: She is alert and oriented to person, place, and time. Mental status is at baseline.      Cranial Nerves: No cranial nerve deficit.      Sensory: No sensory deficit.      Coordination: Coordination normal.      Gait: Gait normal.   Psychiatric:         Mood and Affect: Mood normal.         Thought Content: Thought content normal.         Judgment: Judgment normal.         Assessment/Plan   Problem List Items Addressed This Visit       Hypothyroidism    Relevant Orders    Thyroid Stimulating Hormone (Completed)    T4     Other Visit Diagnoses       Encounter for Medicare annual wellness exam    -  Primary    Routine general medical examination at health care facility        ACP (advance care planning)        Osteopenia, unspecified location        Relevant Medications    ibandronate (Boniva) 150 mg tablet    Other acute sinusitis, recurrence not specified        Relevant Medications    amoxicillin-pot clavulanate (Augmentin) 875-125 mg tablet           Scribe Attestation  By signing my name below, IAlejandra Scribe   attest that this documentation has been prepared under the direction and in the presence of Jimenez Groves DO.  Provider Attestation - Scribe documentation  All medical record entries made by the Scribe were at my direction and personally dictated by me. I have reviewed the chart and agree that the record accurately reflects my personal performance of the history, physical exam, discussion and plan.

## 2023-08-02 ENCOUNTER — LAB (OUTPATIENT)
Dept: LAB | Facility: LAB | Age: 78
End: 2023-08-02
Payer: MEDICARE

## 2023-08-02 DIAGNOSIS — E03.9 HYPOTHYROIDISM, UNSPECIFIED TYPE: ICD-10-CM

## 2023-08-02 LAB — THYROTROPIN (MIU/L) IN SER/PLAS BY DETECTION LIMIT <= 0.05 MIU/L: 0.07 MIU/L (ref 0.44–3.98)

## 2023-08-02 PROCEDURE — 84443 ASSAY THYROID STIM HORMONE: CPT

## 2023-08-02 PROCEDURE — 36415 COLL VENOUS BLD VENIPUNCTURE: CPT

## 2023-08-02 PROCEDURE — 84436 ASSAY OF TOTAL THYROXINE: CPT

## 2023-08-03 LAB — THYROXINE (T4) (UG/DL) IN SER/PLAS: 11.3 UG/DL (ref 4.5–11.1)

## 2023-09-21 DIAGNOSIS — J00 ACUTE RHINITIS: ICD-10-CM

## 2023-09-21 RX ORDER — MONTELUKAST SODIUM 10 MG/1
10 TABLET ORAL DAILY
Qty: 90 TABLET | Refills: 2 | Status: SHIPPED | OUTPATIENT
Start: 2023-09-21 | End: 2024-05-01

## 2023-09-21 NOTE — TELEPHONE ENCOUNTER
Patient of Dr. Groves  Last seen 08/01/2023      Refill request       Disp Refills Start End    montelukast (Singulair) 10 mg tablet 90 tablet 1 4/3/2023     Sig: TAKE 1 TABLET DAILY        Patient called pharmacy and they have no refills on this medication , they need new prescription sent over    Pharmacy [facility] home delivery, 90 day supply

## 2023-12-28 DIAGNOSIS — E03.9 ACQUIRED HYPOTHYROIDISM: Primary | ICD-10-CM

## 2023-12-29 RX ORDER — LEVOTHYROXINE SODIUM 125 UG/1
TABLET ORAL
Qty: 85 TABLET | Refills: 3 | Status: SHIPPED | OUTPATIENT
Start: 2023-12-29 | End: 2024-02-09 | Stop reason: SDUPTHER

## 2024-01-11 ENCOUNTER — TELEPHONE (OUTPATIENT)
Dept: PRIMARY CARE | Facility: CLINIC | Age: 79
End: 2024-01-11
Payer: MEDICARE

## 2024-01-11 DIAGNOSIS — E55.9 VITAMIN D DEFICIENCY: ICD-10-CM

## 2024-01-11 DIAGNOSIS — E03.9 ACQUIRED HYPOTHYROIDISM: ICD-10-CM

## 2024-01-11 DIAGNOSIS — E78.5 DYSLIPIDEMIA: ICD-10-CM

## 2024-01-11 NOTE — TELEPHONE ENCOUNTER
Dr bocanegra pt   Is coming in on February 1 would like lab work order before her apt magnesium lipid comp thyroid t4 ,a1c

## 2024-01-15 ENCOUNTER — LAB (OUTPATIENT)
Dept: LAB | Facility: LAB | Age: 79
End: 2024-01-15
Payer: MEDICARE

## 2024-01-15 DIAGNOSIS — E78.5 DYSLIPIDEMIA: ICD-10-CM

## 2024-01-15 DIAGNOSIS — E55.9 VITAMIN D DEFICIENCY: ICD-10-CM

## 2024-01-15 DIAGNOSIS — E03.9 ACQUIRED HYPOTHYROIDISM: ICD-10-CM

## 2024-01-15 LAB
25(OH)D3 SERPL-MCNC: 47 NG/ML (ref 30–100)
ALBUMIN SERPL BCP-MCNC: 4.5 G/DL (ref 3.4–5)
ALP SERPL-CCNC: 52 U/L (ref 33–136)
ALT SERPL W P-5'-P-CCNC: 19 U/L (ref 7–45)
ANION GAP SERPL CALC-SCNC: 11 MMOL/L (ref 10–20)
AST SERPL W P-5'-P-CCNC: 25 U/L (ref 9–39)
BASOPHILS # BLD AUTO: 0.08 X10*3/UL (ref 0–0.1)
BASOPHILS NFR BLD AUTO: 1 %
BILIRUB SERPL-MCNC: 0.7 MG/DL (ref 0–1.2)
BUN SERPL-MCNC: 20 MG/DL (ref 6–23)
CALCIUM SERPL-MCNC: 9.7 MG/DL (ref 8.6–10.3)
CHLORIDE SERPL-SCNC: 106 MMOL/L (ref 98–107)
CHOLEST SERPL-MCNC: 152 MG/DL (ref 0–199)
CHOLESTEROL/HDL RATIO: 3.1
CO2 SERPL-SCNC: 27 MMOL/L (ref 21–32)
CREAT SERPL-MCNC: 0.99 MG/DL (ref 0.5–1.05)
EGFRCR SERPLBLD CKD-EPI 2021: 58 ML/MIN/1.73M*2
EOSINOPHIL # BLD AUTO: 0.68 X10*3/UL (ref 0–0.4)
EOSINOPHIL NFR BLD AUTO: 8.6 %
ERYTHROCYTE [DISTWIDTH] IN BLOOD BY AUTOMATED COUNT: 12.9 % (ref 11.5–14.5)
GLUCOSE SERPL-MCNC: 98 MG/DL (ref 74–99)
HCT VFR BLD AUTO: 38 % (ref 36–46)
HDLC SERPL-MCNC: 49 MG/DL
HGB BLD-MCNC: 12.2 G/DL (ref 12–16)
IMM GRANULOCYTES # BLD AUTO: 0.02 X10*3/UL (ref 0–0.5)
IMM GRANULOCYTES NFR BLD AUTO: 0.3 % (ref 0–0.9)
LDLC SERPL CALC-MCNC: 76 MG/DL
LYMPHOCYTES # BLD AUTO: 2.09 X10*3/UL (ref 0.8–3)
LYMPHOCYTES NFR BLD AUTO: 26.5 %
MCH RBC QN AUTO: 28.6 PG (ref 26–34)
MCHC RBC AUTO-ENTMCNC: 32.1 G/DL (ref 32–36)
MCV RBC AUTO: 89 FL (ref 80–100)
MONOCYTES # BLD AUTO: 0.73 X10*3/UL (ref 0.05–0.8)
MONOCYTES NFR BLD AUTO: 9.3 %
NEUTROPHILS # BLD AUTO: 4.29 X10*3/UL (ref 1.6–5.5)
NEUTROPHILS NFR BLD AUTO: 54.3 %
NON HDL CHOLESTEROL: 103 MG/DL (ref 0–149)
NRBC BLD-RTO: 0 /100 WBCS (ref 0–0)
PLATELET # BLD AUTO: 283 X10*3/UL (ref 150–450)
POTASSIUM SERPL-SCNC: 4.7 MMOL/L (ref 3.5–5.3)
PROT SERPL-MCNC: 7.2 G/DL (ref 6.4–8.2)
RBC # BLD AUTO: 4.26 X10*6/UL (ref 4–5.2)
SODIUM SERPL-SCNC: 139 MMOL/L (ref 136–145)
TRIGL SERPL-MCNC: 136 MG/DL (ref 0–149)
TSH SERPL-ACNC: 1.01 MIU/L (ref 0.44–3.98)
VLDL: 27 MG/DL (ref 0–40)
WBC # BLD AUTO: 7.9 X10*3/UL (ref 4.4–11.3)

## 2024-01-15 PROCEDURE — 85025 COMPLETE CBC W/AUTO DIFF WBC: CPT

## 2024-01-15 PROCEDURE — 80053 COMPREHEN METABOLIC PANEL: CPT

## 2024-01-15 PROCEDURE — 82306 VITAMIN D 25 HYDROXY: CPT

## 2024-01-15 PROCEDURE — 80061 LIPID PANEL: CPT

## 2024-01-15 PROCEDURE — 84443 ASSAY THYROID STIM HORMONE: CPT

## 2024-01-15 PROCEDURE — 36415 COLL VENOUS BLD VENIPUNCTURE: CPT

## 2024-01-29 ENCOUNTER — TELEPHONE (OUTPATIENT)
Dept: PRIMARY CARE | Facility: CLINIC | Age: 79
End: 2024-01-29

## 2024-01-29 NOTE — TELEPHONE ENCOUNTER
[id number] - [place], [place] - [address] [facility] [address]   [address] [facility] [address]   PT IS REQUESTING A ZPACK FOR SINUS INFECTION PLEASE

## 2024-01-31 ENCOUNTER — APPOINTMENT (OUTPATIENT)
Dept: PRIMARY CARE | Facility: CLINIC | Age: 79
End: 2024-01-31
Payer: MEDICARE

## 2024-02-01 ENCOUNTER — APPOINTMENT (OUTPATIENT)
Dept: PRIMARY CARE | Facility: CLINIC | Age: 79
End: 2024-02-01
Payer: MEDICARE

## 2024-02-04 DIAGNOSIS — E78.5 DYSLIPIDEMIA: ICD-10-CM

## 2024-02-04 DIAGNOSIS — E78.00 HYPERCHOLESTEROLEMIA: ICD-10-CM

## 2024-02-06 RX ORDER — ATORVASTATIN CALCIUM 80 MG/1
80 TABLET, FILM COATED ORAL NIGHTLY
Qty: 90 TABLET | Refills: 3 | Status: SHIPPED | OUTPATIENT
Start: 2024-02-06

## 2024-02-06 RX ORDER — EZETIMIBE 10 MG/1
10 TABLET ORAL DAILY
Qty: 90 TABLET | Refills: 3 | Status: SHIPPED | OUTPATIENT
Start: 2024-02-06

## 2024-02-09 ENCOUNTER — OFFICE VISIT (OUTPATIENT)
Dept: PRIMARY CARE | Facility: CLINIC | Age: 79
End: 2024-02-09
Payer: MEDICARE

## 2024-02-09 VITALS
SYSTOLIC BLOOD PRESSURE: 124 MMHG | BODY MASS INDEX: 23.63 KG/M2 | WEIGHT: 138.4 LBS | HEIGHT: 64 IN | DIASTOLIC BLOOD PRESSURE: 78 MMHG | RESPIRATION RATE: 15 BRPM | TEMPERATURE: 97.3 F | HEART RATE: 77 BPM | OXYGEN SATURATION: 97 %

## 2024-02-09 DIAGNOSIS — G47.19 EXCESSIVE DAYTIME SLEEPINESS: ICD-10-CM

## 2024-02-09 DIAGNOSIS — R06.83 SNORING: ICD-10-CM

## 2024-02-09 DIAGNOSIS — J01.10 ACUTE NON-RECURRENT FRONTAL SINUSITIS: ICD-10-CM

## 2024-02-09 DIAGNOSIS — N18.31 STAGE 3A CHRONIC KIDNEY DISEASE (MULTI): ICD-10-CM

## 2024-02-09 DIAGNOSIS — R25.2 LEG CRAMPS: ICD-10-CM

## 2024-02-09 DIAGNOSIS — I25.10 CORONARY ARTERY DISEASE, UNSPECIFIED VESSEL OR LESION TYPE, UNSPECIFIED WHETHER ANGINA PRESENT, UNSPECIFIED WHETHER NATIVE OR TRANSPLANTED HEART: ICD-10-CM

## 2024-02-09 DIAGNOSIS — E03.9 ACQUIRED HYPOTHYROIDISM: Primary | ICD-10-CM

## 2024-02-09 PROCEDURE — 99213 OFFICE O/P EST LOW 20 MIN: CPT | Performed by: FAMILY MEDICINE

## 2024-02-09 RX ORDER — DOCUSATE SODIUM 100 MG/1
100 CAPSULE, LIQUID FILLED ORAL 2 TIMES DAILY
COMMUNITY

## 2024-02-09 RX ORDER — EPINEPHRINE 0.22MG
100 AEROSOL WITH ADAPTER (ML) INHALATION DAILY
COMMUNITY

## 2024-02-09 RX ORDER — LEVOTHYROXINE SODIUM 100 UG/1
100 TABLET ORAL
Qty: 90 TABLET | Refills: 1 | Status: SHIPPED | OUTPATIENT
Start: 2024-02-09

## 2024-02-09 RX ORDER — CAMPHOR/EUCALYPT/MENTH/BENZOIN
LIQUID (ML) MISCELLANEOUS
COMMUNITY

## 2024-02-09 RX ORDER — AZITHROMYCIN 250 MG/1
TABLET, FILM COATED ORAL
Qty: 6 TABLET | Refills: 0 | Status: SHIPPED | OUTPATIENT
Start: 2024-02-09 | End: 2024-02-14

## 2024-02-09 RX ORDER — AMPICILLIN TRIHYDRATE 250 MG
CAPSULE ORAL
COMMUNITY

## 2024-02-09 RX ORDER — DIPHENHYDRAMINE HCL 25 MG
25 TABLET ORAL NIGHTLY PRN
COMMUNITY

## 2024-02-09 RX ORDER — NITROGLYCERIN 0.4 MG/1
0.4 TABLET SUBLINGUAL EVERY 5 MIN PRN
Qty: 25 TABLET | Refills: 1 | Status: SHIPPED | OUTPATIENT
Start: 2024-02-09 | End: 2024-05-01

## 2024-02-09 RX ORDER — MAGNESIUM 250 MG
250 TABLET ORAL DAILY
COMMUNITY

## 2024-02-09 RX ORDER — CHOLECALCIFEROL (VITAMIN D3) 50 MCG
50 TABLET ORAL DAILY
COMMUNITY

## 2024-02-09 ASSESSMENT — ENCOUNTER SYMPTOMS
PHOTOPHOBIA: 0
SEIZURES: 0
ADENOPATHY: 0
CHEST TIGHTNESS: 0
NECK STIFFNESS: 0
NERVOUS/ANXIOUS: 0
SORE THROAT: 0
CONFUSION: 0
HEADACHES: 0
STRIDOR: 0
TROUBLE SWALLOWING: 0
DECREASED CONCENTRATION: 0
ABDOMINAL DISTENTION: 0
RHINORRHEA: 0
MYALGIAS: 0
FATIGUE: 0
SINUS PAIN: 0
COLOR CHANGE: 0
RECTAL PAIN: 0
HEMATURIA: 0
APPETITE CHANGE: 0
DYSPHORIC MOOD: 0
FEVER: 0
DIARRHEA: 0
DYSURIA: 0
ARTHRALGIAS: 0
CONSTIPATION: 0
ACTIVITY CHANGE: 0
FLANK PAIN: 0
CONSTITUTIONAL NEGATIVE: 1
PALPITATIONS: 0
COUGH: 0
POLYPHAGIA: 0
SHORTNESS OF BREATH: 0
BLOOD IN STOOL: 0
ABDOMINAL PAIN: 0
POLYDIPSIA: 0
SPEECH DIFFICULTY: 0
EYE PAIN: 0
SLEEP DISTURBANCE: 0
AGITATION: 0
SINUS PRESSURE: 0
DIZZINESS: 0

## 2024-02-09 NOTE — PATIENT INSTRUCTIONS
Follow up in 4 months    Continue current medications and therapy for chronic medical conditions.    Patient was advised importance of proper diet/nutrition in addition adequate hydration. Patient was encouraged moderate exercise program to include 30 minutes daily for 5 days of the week or 150 minutes weekly. Patient will follow-up with us as scheduled.    Review lab results from January 2024    Obtain Magnesium level    Decrease Levothyroxine to 100mcg once daily     Patient has experiencing daytime somnolence and snoring and is need of a sleep study to determine if CPAP is appropriate.

## 2024-02-09 NOTE — PROGRESS NOTES
"Subjective   Patient ID: David Medina is a 78 y.o. female who presents for Hypothyroidism.    Patient is here for follow up hypothyroidism.    Patient would like discuss the levothyroxine medication used.    Patient had done blood work on 01/05/2024         Review of Systems   Constitutional: Negative.  Negative for activity change, appetite change, fatigue and fever.   HENT:  Negative for congestion, dental problem, ear discharge, ear pain, mouth sores, rhinorrhea, sinus pressure, sinus pain, sore throat, tinnitus and trouble swallowing.    Eyes:  Negative for photophobia, pain and visual disturbance.   Respiratory:  Negative for cough, chest tightness, shortness of breath and stridor.    Cardiovascular:  Negative for chest pain and palpitations.   Gastrointestinal:  Negative for abdominal distention, abdominal pain, blood in stool, constipation, diarrhea and rectal pain.   Endocrine: Negative for cold intolerance, heat intolerance, polydipsia, polyphagia and polyuria.   Genitourinary:  Negative for dysuria, flank pain, hematuria and urgency.   Musculoskeletal:  Negative for arthralgias, gait problem, myalgias and neck stiffness.   Skin:  Negative for color change and rash.   Allergic/Immunologic: Negative for environmental allergies and food allergies.   Neurological:  Negative for dizziness, seizures, syncope, speech difficulty and headaches.   Hematological:  Negative for adenopathy.   Psychiatric/Behavioral:  Negative for agitation, confusion, decreased concentration, dysphoric mood and sleep disturbance. The patient is not nervous/anxious.        Objective   /78 (BP Location: Left arm, Patient Position: Sitting, BP Cuff Size: Adult)   Pulse 77   Temp 36.3 °C (97.3 °F)   Resp 15   Ht 1.626 m (5' 4\")   Wt 62.8 kg (138 lb 6.4 oz)   SpO2 97%   BMI 23.76 kg/m²     Physical Exam  Vitals reviewed.   Constitutional:       General: She is not in acute distress.     Appearance: Normal appearance. She is " normal weight. She is not ill-appearing or diaphoretic.   HENT:      Head: Normocephalic.      Right Ear: Tympanic membrane and external ear normal.      Left Ear: Tympanic membrane and external ear normal.      Nose: Nose normal. No congestion.      Mouth/Throat:      Pharynx: No posterior oropharyngeal erythema.   Eyes:      General:         Right eye: No discharge.         Left eye: No discharge.      Extraocular Movements: Extraocular movements intact.      Conjunctiva/sclera: Conjunctivae normal.      Pupils: Pupils are equal, round, and reactive to light.   Cardiovascular:      Rate and Rhythm: Normal rate and regular rhythm.      Pulses: Normal pulses.      Heart sounds: Normal heart sounds. No murmur heard.  Pulmonary:      Effort: Pulmonary effort is normal. No respiratory distress.      Breath sounds: Normal breath sounds. No wheezing or rales.   Chest:      Chest wall: No tenderness.   Abdominal:      General: Abdomen is flat. Bowel sounds are normal. There is no distension.      Palpations: There is no mass.      Tenderness: There is no abdominal tenderness. There is no guarding.   Musculoskeletal:         General: No tenderness. Normal range of motion.      Cervical back: Normal range of motion and neck supple. No tenderness.      Right lower leg: No edema.      Left lower leg: No edema.   Skin:     General: Skin is dry.      Coloration: Skin is not jaundiced.      Findings: No bruising, erythema or rash.   Neurological:      General: No focal deficit present.      Mental Status: She is alert and oriented to person, place, and time. Mental status is at baseline.      Cranial Nerves: No cranial nerve deficit.      Sensory: No sensory deficit.      Coordination: Coordination normal.      Gait: Gait normal.   Psychiatric:         Mood and Affect: Mood normal.         Thought Content: Thought content normal.         Judgment: Judgment normal.         Assessment/Plan   Problem List Items Addressed This Visit              ICD-10-CM    CAD (coronary artery disease) I25.10    Relevant Medications    nitroglycerin (Nitrostat) 0.4 mg SL tablet    Hypothyroidism - Primary E03.9    Relevant Medications    levothyroxine (Synthroid, Levoxyl) 100 mcg tablet    Stage 3a chronic kidney disease (CMS/HCC) N18.31     Other Visit Diagnoses         Codes    Leg cramps     R25.2    Relevant Orders    Magnesium    Acute non-recurrent frontal sinusitis     J01.10    Relevant Medications    azithromycin (Zithromax) 250 mg tablet    Snoring     R06.83    Relevant Orders    Home sleep apnea test (HSAT)    Excessive daytime sleepiness     G47.19    Relevant Orders    Home sleep apnea test (HSAT)              Scribe Attestation  By signing my name below, IAurelia RMA , [place]   attest that this documentation has been prepared under the direction and in the presence of Jimenez Groves DO.   Provider Attestation - Scribe documentation    All medical record entries made by the Scribe were at my direction and personally dictated by me. I have reviewed the chart and agree that the record accurately reflects my personal performance of the history, physical exam, discussion and plan.

## 2024-02-10 PROBLEM — N18.31 STAGE 3A CHRONIC KIDNEY DISEASE (MULTI): Status: ACTIVE | Noted: 2024-02-10

## 2024-02-23 ENCOUNTER — CLINICAL SUPPORT (OUTPATIENT)
Dept: SLEEP MEDICINE | Facility: HOSPITAL | Age: 79
End: 2024-02-23
Payer: MEDICARE

## 2024-02-23 DIAGNOSIS — G47.19 EXCESSIVE DAYTIME SLEEPINESS: ICD-10-CM

## 2024-02-23 DIAGNOSIS — R06.83 SNORING: ICD-10-CM

## 2024-02-23 PROCEDURE — 95806 SLEEP STUDY UNATT&RESP EFFT: CPT | Performed by: ALLERGY & IMMUNOLOGY

## 2024-02-26 ENCOUNTER — TELEPHONE (OUTPATIENT)
Dept: PRIMARY CARE | Facility: CLINIC | Age: 79
End: 2024-02-26
Payer: MEDICARE

## 2024-02-26 NOTE — TELEPHONE ENCOUNTER
Dr bocanegra pt  Pt took ibandronate (Boniva) 150 mg tablet  and broke out in hives she went to the urgent care and the dr advised to stop taking    RRT activated with /177 Patient hypertensive upon assessment Pt complaining of nausea and headache. /123. .

## 2024-03-15 PROBLEM — M65.9 TENOSYNOVITIS OF LEFT WRIST: Status: ACTIVE | Noted: 2024-03-15

## 2024-03-15 PROBLEM — M81.0 OSTEOPOROSIS: Status: ACTIVE | Noted: 2022-12-12

## 2024-03-15 PROBLEM — M65.932 TENOSYNOVITIS OF LEFT WRIST: Status: ACTIVE | Noted: 2024-03-15

## 2024-03-15 PROBLEM — R00.0 SINUS TACHYCARDIA: Status: ACTIVE | Noted: 2024-03-15

## 2024-03-21 DIAGNOSIS — L50.9 HIVES: ICD-10-CM

## 2024-03-21 NOTE — TELEPHONE ENCOUNTER
LMOM to call office back.     Per BESS- stop medication and she can take Allergra over the counter

## 2024-03-21 NOTE — TELEPHONE ENCOUNTER
TOOK    ibandronate (Boniva) 150 mg tablet      Has been breaking out since day she took it. Wondering if  has something for her to get rid of this reaction.    Please advise       Pharmacy    [facility]/pharmacy #[id number] - [place] [id number]  Phone: [phone]  Fax: [phone]

## 2024-03-27 RX ORDER — PREDNISONE 10 MG/1
TABLET ORAL
Qty: 20 TABLET | Refills: 0 | OUTPATIENT
Start: 2024-03-27 | End: 2024-03-30

## 2024-03-27 NOTE — TELEPHONE ENCOUNTER
Per BESS- start patient on tapering dose of prednisone.     Patient made aware     I did advise to follow up/ see walk-in/ [facility] if the symptoms do not improve with prednisone

## 2024-03-27 NOTE — TELEPHONE ENCOUNTER
Dr bocanegra pt  Called back she took the allergra and she is still has welts pt was wondering if sahra can call something in for her

## 2024-03-30 ENCOUNTER — HOSPITAL ENCOUNTER (EMERGENCY)
Facility: HOSPITAL | Age: 79
Discharge: HOME | End: 2024-03-30
Attending: EMERGENCY MEDICINE
Payer: MEDICARE

## 2024-03-30 VITALS
HEIGHT: 64 IN | HEART RATE: 95 BPM | OXYGEN SATURATION: 97 % | RESPIRATION RATE: 18 BRPM | WEIGHT: 143 LBS | BODY MASS INDEX: 24.41 KG/M2 | SYSTOLIC BLOOD PRESSURE: 154 MMHG | DIASTOLIC BLOOD PRESSURE: 74 MMHG | TEMPERATURE: 98.1 F

## 2024-03-30 DIAGNOSIS — L50.9 URTICARIA: Primary | ICD-10-CM

## 2024-03-30 PROCEDURE — 99283 EMERGENCY DEPT VISIT LOW MDM: CPT

## 2024-03-30 RX ORDER — PREDNISONE 50 MG/1
50 TABLET ORAL DAILY
Qty: 7 TABLET | Refills: 0 | Status: SHIPPED | OUTPATIENT
Start: 2024-03-30 | End: 2024-04-06

## 2024-03-30 RX ORDER — FAMOTIDINE 40 MG/1
40 TABLET, FILM COATED ORAL NIGHTLY PRN
Qty: 20 TABLET | Refills: 0 | Status: SHIPPED | OUTPATIENT
Start: 2024-03-30 | End: 2024-04-24 | Stop reason: SDUPTHER

## 2024-03-30 ASSESSMENT — COLUMBIA-SUICIDE SEVERITY RATING SCALE - C-SSRS
6. HAVE YOU EVER DONE ANYTHING, STARTED TO DO ANYTHING, OR PREPARED TO DO ANYTHING TO END YOUR LIFE?: NO
1. IN THE PAST MONTH, HAVE YOU WISHED YOU WERE DEAD OR WISHED YOU COULD GO TO SLEEP AND NOT WAKE UP?: NO
2. HAVE YOU ACTUALLY HAD ANY THOUGHTS OF KILLING YOURSELF?: NO

## 2024-03-30 ASSESSMENT — VISUAL ACUITY: OU: 1

## 2024-03-30 ASSESSMENT — PAIN - FUNCTIONAL ASSESSMENT: PAIN_FUNCTIONAL_ASSESSMENT: 0-10

## 2024-03-30 ASSESSMENT — PAIN SCALES - GENERAL: PAINLEVEL_OUTOF10: 0 - NO PAIN

## 2024-03-30 NOTE — ED PROVIDER NOTES
Itchy rash.  This 78-year-old white female presents to the ED secondary to a rash that began on February 18, 2024 she states that started after she took a medication that is once a month for treatment of osteoporosis.  She states that she had taken the medication past 3 months prior to this dose and had no issues with respect to rash or itching.  She initially was treated with prednisone with some improvement of the rash she states that then it recurred after she stopped taking the steroid she is currently on a course of prednisone 30 mg daily currently as well as Allegra.  She states that the rash will get a little better and then come back worse with itching.  She denies any shortness of breath or problems breathing or swallowing.  She does admit to cough that she did not have before.  She states that she has been seen in urgent care and her primary care doctor.      History provided by:  Patient   used: No         Physical Exam  Vitals and nursing note reviewed.   Constitutional:       Appearance: Normal appearance. She is overweight.   HENT:      Head: Normocephalic and atraumatic.      Right Ear: Hearing and external ear normal.      Left Ear: Hearing and external ear normal.      Nose: Nose normal. No congestion or rhinorrhea.      Mouth/Throat:      Lips: Pink.      Mouth: Mucous membranes are moist.      Pharynx: Oropharynx is clear. Uvula midline. No pharyngeal swelling, oropharyngeal exudate, posterior oropharyngeal erythema or uvula swelling.   Eyes:      General: Lids are normal. Vision grossly intact.         Right eye: No discharge.         Left eye: No discharge.      Extraocular Movements: Extraocular movements intact.      Conjunctiva/sclera: Conjunctivae normal.      Pupils: Pupils are equal, round, and reactive to light.   Cardiovascular:      Rate and Rhythm: Normal rate and regular rhythm.      Pulses: Normal pulses.      Heart sounds: Normal heart sounds. No murmur heard.      No friction rub. No gallop.   Pulmonary:      Effort: Pulmonary effort is normal. No respiratory distress.      Breath sounds: Normal breath sounds. No stridor. No wheezing, rhonchi or rales.   Chest:      Chest wall: No tenderness.   Abdominal:      General: Abdomen is flat and protuberant. Bowel sounds are normal. There is no distension.      Palpations: Abdomen is soft. There is no mass.      Tenderness: There is no abdominal tenderness. There is no guarding or rebound.      Hernia: No hernia is present.   Musculoskeletal:         General: No swelling, tenderness, deformity or signs of injury. Normal range of motion.      Cervical back: Full passive range of motion without pain, normal range of motion and neck supple.      Right lower leg: No edema.      Left lower leg: No edema.   Skin:     General: Skin is warm and dry.      Capillary Refill: Capillary refill takes less than 2 seconds.      Coloration: Skin is not jaundiced or pale.      Findings: Rash present. No bruising, erythema or lesion. Rash is urticarial.      Comments: Patient has perioral rash from head to toe.  It easily blanches.   Neurological:      General: No focal deficit present.      Mental Status: She is alert and oriented to person, place, and time.      GCS: GCS eye subscore is 4. GCS verbal subscore is 5. GCS motor subscore is 6.      Cranial Nerves: No cranial nerve deficit.      Sensory: Sensation is intact. No sensory deficit.      Motor: Motor function is intact. No weakness.      Coordination: Coordination is intact. Coordination normal.      Deep Tendon Reflexes: Reflexes normal.   Psychiatric:         Attention and Perception: Perception normal.         Mood and Affect: Mood and affect normal.         Speech: Speech normal.         Behavior: Behavior normal. Behavior is cooperative.         Thought Content: Thought content normal.         Cognition and Memory: Cognition and memory normal.         Judgment: Judgment normal.           Labs Reviewed - No data to display     No orders to display        Procedures     Medical Decision Making  Patient was seen and evaluated due to itchy rash that visually is most consistent with urticaria.  Patient did get relief initially with treatment with prednisone and currently is on a low-dose prednisone with recurrence of the rash she was prescribed prednisone at higher dose as well as Pepcid and referred to dermatologist for reevaluation.  The patient was then discharged home in stable satisfactory condition.         Diagnoses as of 03/30/24 0802   Urticaria                    Tima Aguilar DO  03/31/24 0754

## 2024-04-22 NOTE — PROGRESS NOTES
Patient presents to the office today to Establish with Right Flank Pain and Hematuria... LUTs are chronic and mild. Denies frequency and urgency. Denies dysuria and hematuria.. Nocturia x1.. Caffeine does worsen LUTs.. No medications for LUTs.. No Hx of Kidney Stones... Hx of UTI's...(years ago) No Recent Imaging...

## 2024-04-24 DIAGNOSIS — L50.9 URTICARIA: ICD-10-CM

## 2024-04-24 RX ORDER — FAMOTIDINE 40 MG/1
40 TABLET, FILM COATED ORAL NIGHTLY PRN
Qty: 90 TABLET | Refills: 1 | Status: SHIPPED | OUTPATIENT
Start: 2024-04-24

## 2024-04-24 NOTE — TELEPHONE ENCOUNTER
[facility]/pharmacy #[id number] - [place], [place] - [address] [place] [id number]   Pt needs refill on famotidine please, this is helping with rash and she is unable to get in with allergy doctor any sooner than July.  If possible please just send 3 month supply of this to [facility].

## 2024-04-29 ENCOUNTER — OFFICE VISIT (OUTPATIENT)
Dept: UROLOGY | Facility: CLINIC | Age: 79
End: 2024-04-29
Payer: MEDICARE

## 2024-04-29 VITALS
WEIGHT: 140 LBS | HEART RATE: 86 BPM | DIASTOLIC BLOOD PRESSURE: 87 MMHG | BODY MASS INDEX: 23.9 KG/M2 | HEIGHT: 64 IN | SYSTOLIC BLOOD PRESSURE: 156 MMHG

## 2024-04-29 DIAGNOSIS — R10.9 FLANK PAIN: ICD-10-CM

## 2024-04-29 DIAGNOSIS — J00 ACUTE RHINITIS: ICD-10-CM

## 2024-04-29 DIAGNOSIS — R31.9 HEMATURIA, UNSPECIFIED TYPE: Primary | ICD-10-CM

## 2024-04-29 PROCEDURE — 99204 OFFICE O/P NEW MOD 45 MIN: CPT | Performed by: STUDENT IN AN ORGANIZED HEALTH CARE EDUCATION/TRAINING PROGRAM

## 2024-04-29 PROCEDURE — 87086 URINE CULTURE/COLONY COUNT: CPT

## 2024-04-29 PROCEDURE — 1160F RVW MEDS BY RX/DR IN RCRD: CPT | Performed by: STUDENT IN AN ORGANIZED HEALTH CARE EDUCATION/TRAINING PROGRAM

## 2024-04-29 PROCEDURE — 1159F MED LIST DOCD IN RCRD: CPT | Performed by: STUDENT IN AN ORGANIZED HEALTH CARE EDUCATION/TRAINING PROGRAM

## 2024-04-29 PROCEDURE — 1036F TOBACCO NON-USER: CPT | Performed by: STUDENT IN AN ORGANIZED HEALTH CARE EDUCATION/TRAINING PROGRAM

## 2024-04-29 ASSESSMENT — ENCOUNTER SYMPTOMS
DEPRESSION: 0
OCCASIONAL FEELINGS OF UNSTEADINESS: 0
LOSS OF SENSATION IN FEET: 0

## 2024-04-29 NOTE — ADDENDUM NOTE
Addended by: MICHELLE MOROCHO on: 4/29/2024 04:05 PM     Modules accepted: Orders     Walk in Private Auto

## 2024-04-29 NOTE — PROGRESS NOTES
Subjective   Patient ID: David Medina is a 79 y.o. female    HPI  79 y.o. female who presents to the office today to Establish with Right Flank Pain and Hematuria... LUTs are chronic and mild. Denies frequency and urgency. Denies dysuria. Nocturia x1.. Caffeine does worsen LUTs.. No medications for LUTs.. No Hx of Kidney Stones... Hx of UTI's...(years ago) No Recent Imaging...          Review of Systems    All systems were reviewed. Anything negative was noted in the HPI.    Objective   Physical Exam    General: Well developed, well nourished, alert and cooperative, appears in no acute distress   Eyes: Non-injected conjunctiva, sclera clear, no proptosis   Cardiac: Extremities are warm and well perfused. No edema, cyanosis or pallor   Lungs: Breathing is easy, non-labored. Speaking in clear and complete sentences. Normal diaphragmatic movement   MSK: Ambulatory with steady gait, unassisted   Neuro: Alert and oriented to person, place, and time   Psych: Demonstrates good judgment and reason, without hallucinations, abnormal affect or abnormal behaviors   Skin: No obvious lesions, no rashes       No CVA tenderness bilaterally   No suprapubic pain or discomfort       Past Medical History:   Diagnosis Date    Disorder of thyroid, unspecified 12/26/2021    Thyroid dysfunction         Past Surgical History:   Procedure Laterality Date    OTHER SURGICAL HISTORY  05/16/2022    Cardiac catheterization    OTHER SURGICAL HISTORY  05/16/2022    Ankle surgery    OTHER SURGICAL HISTORY  05/16/2022    Tonsillectomy    OTHER SURGICAL HISTORY  05/16/2022    Rhinoplasty    OTHER SURGICAL HISTORY  05/16/2022    Subtotal thyroidectomy    OTHER SURGICAL HISTORY  05/16/2022    Hysterectomy           Assessment/Plan   Lower Urinary Tract symptoms, left flank pain     79 y.o. female who presents for the above condition, We had a very long and extensive discussion with the patient regarding the pathophysiology, differential diagnosis,  risk factor, management, natural history, incidence and diagnostic work-up of the condition.      Plan:  - Urine Culture  - CT abdomen pelvis wo contrast  - Follow up with CT results        4/29/2024    Paola Attestation  By signing my name below, I, Paola Burris   attest that this documentation has been prepared under the direction and in the presence of Dr. Vikas Espinoza

## 2024-05-01 DIAGNOSIS — I25.10 CORONARY ARTERY DISEASE, UNSPECIFIED VESSEL OR LESION TYPE, UNSPECIFIED WHETHER ANGINA PRESENT, UNSPECIFIED WHETHER NATIVE OR TRANSPLANTED HEART: ICD-10-CM

## 2024-05-01 RX ORDER — MONTELUKAST SODIUM 10 MG/1
10 TABLET ORAL DAILY
Qty: 90 TABLET | Refills: 1 | Status: SHIPPED | OUTPATIENT
Start: 2024-05-01

## 2024-05-01 RX ORDER — NITROGLYCERIN 0.4 MG/1
0.4 TABLET SUBLINGUAL EVERY 5 MIN PRN
Qty: 25 TABLET | Refills: 1 | Status: SHIPPED | OUTPATIENT
Start: 2024-05-01

## 2024-05-02 LAB — BACTERIA UR CULT: NORMAL

## 2024-05-12 ENCOUNTER — HOSPITAL ENCOUNTER (EMERGENCY)
Facility: HOSPITAL | Age: 79
Discharge: HOME | End: 2024-05-12
Attending: EMERGENCY MEDICINE
Payer: MEDICARE

## 2024-05-12 ENCOUNTER — APPOINTMENT (OUTPATIENT)
Dept: RADIOLOGY | Facility: HOSPITAL | Age: 79
End: 2024-05-12
Payer: MEDICARE

## 2024-05-12 VITALS
BODY MASS INDEX: 23.9 KG/M2 | RESPIRATION RATE: 16 BRPM | SYSTOLIC BLOOD PRESSURE: 114 MMHG | OXYGEN SATURATION: 98 % | DIASTOLIC BLOOD PRESSURE: 70 MMHG | HEIGHT: 64 IN | HEART RATE: 70 BPM | TEMPERATURE: 97.4 F | WEIGHT: 140 LBS

## 2024-05-12 DIAGNOSIS — R21 RASH: Primary | ICD-10-CM

## 2024-05-12 LAB
ALBUMIN SERPL BCP-MCNC: 3.8 G/DL (ref 3.4–5)
ALP SERPL-CCNC: 41 U/L (ref 33–136)
ALT SERPL W P-5'-P-CCNC: 10 U/L (ref 7–45)
ANION GAP SERPL CALC-SCNC: 11 MMOL/L (ref 10–20)
AST SERPL W P-5'-P-CCNC: 18 U/L (ref 9–39)
BASOPHILS # BLD AUTO: 0.01 X10*3/UL (ref 0–0.1)
BASOPHILS NFR BLD AUTO: 0.1 %
BILIRUB SERPL-MCNC: 0.9 MG/DL (ref 0–1.2)
BUN SERPL-MCNC: 22 MG/DL (ref 6–23)
CALCIUM SERPL-MCNC: 8.8 MG/DL (ref 8.6–10.3)
CHLORIDE SERPL-SCNC: 105 MMOL/L (ref 98–107)
CO2 SERPL-SCNC: 24 MMOL/L (ref 21–32)
CREAT SERPL-MCNC: 1.31 MG/DL (ref 0.5–1.05)
EGFRCR SERPLBLD CKD-EPI 2021: 41 ML/MIN/1.73M*2
EOSINOPHIL # BLD AUTO: 0.06 X10*3/UL (ref 0–0.4)
EOSINOPHIL NFR BLD AUTO: 0.9 %
ERYTHROCYTE [DISTWIDTH] IN BLOOD BY AUTOMATED COUNT: 12.9 % (ref 11.5–14.5)
GLUCOSE SERPL-MCNC: 146 MG/DL (ref 74–99)
HCT VFR BLD AUTO: 39 % (ref 36–46)
HGB BLD-MCNC: 12.6 G/DL (ref 12–16)
IMM GRANULOCYTES # BLD AUTO: 0.01 X10*3/UL (ref 0–0.5)
IMM GRANULOCYTES NFR BLD AUTO: 0.1 % (ref 0–0.9)
LYMPHOCYTES # BLD AUTO: 1.4 X10*3/UL (ref 0.8–3)
LYMPHOCYTES NFR BLD AUTO: 20.5 %
MCH RBC QN AUTO: 29.1 PG (ref 26–34)
MCHC RBC AUTO-ENTMCNC: 32.3 G/DL (ref 32–36)
MCV RBC AUTO: 90 FL (ref 80–100)
MONOCYTES # BLD AUTO: 0.26 X10*3/UL (ref 0.05–0.8)
MONOCYTES NFR BLD AUTO: 3.8 %
NEUTROPHILS # BLD AUTO: 5.1 X10*3/UL (ref 1.6–5.5)
NEUTROPHILS NFR BLD AUTO: 74.6 %
NRBC BLD-RTO: 0 /100 WBCS (ref 0–0)
PLATELET # BLD AUTO: 323 X10*3/UL (ref 150–450)
POTASSIUM SERPL-SCNC: 4.2 MMOL/L (ref 3.5–5.3)
PROT SERPL-MCNC: 6.5 G/DL (ref 6.4–8.2)
RBC # BLD AUTO: 4.33 X10*6/UL (ref 4–5.2)
SODIUM SERPL-SCNC: 136 MMOL/L (ref 136–145)
WBC # BLD AUTO: 6.8 X10*3/UL (ref 4.4–11.3)

## 2024-05-12 PROCEDURE — 80053 COMPREHEN METABOLIC PANEL: CPT | Performed by: EMERGENCY MEDICINE

## 2024-05-12 PROCEDURE — 71045 X-RAY EXAM CHEST 1 VIEW: CPT

## 2024-05-12 PROCEDURE — 36415 COLL VENOUS BLD VENIPUNCTURE: CPT | Performed by: EMERGENCY MEDICINE

## 2024-05-12 PROCEDURE — 99283 EMERGENCY DEPT VISIT LOW MDM: CPT

## 2024-05-12 PROCEDURE — 71045 X-RAY EXAM CHEST 1 VIEW: CPT | Performed by: RADIOLOGY

## 2024-05-12 PROCEDURE — 85025 COMPLETE CBC W/AUTO DIFF WBC: CPT | Performed by: EMERGENCY MEDICINE

## 2024-05-12 RX ORDER — PREDNISONE 50 MG/1
50 TABLET ORAL DAILY
Qty: 7 TABLET | Refills: 0 | Status: SHIPPED | OUTPATIENT
Start: 2024-05-12 | End: 2024-05-19

## 2024-05-12 ASSESSMENT — COLUMBIA-SUICIDE SEVERITY RATING SCALE - C-SSRS
2. HAVE YOU ACTUALLY HAD ANY THOUGHTS OF KILLING YOURSELF?: NO
6. HAVE YOU EVER DONE ANYTHING, STARTED TO DO ANYTHING, OR PREPARED TO DO ANYTHING TO END YOUR LIFE?: NO
1. IN THE PAST MONTH, HAVE YOU WISHED YOU WERE DEAD OR WISHED YOU COULD GO TO SLEEP AND NOT WAKE UP?: NO

## 2024-05-12 ASSESSMENT — PAIN - FUNCTIONAL ASSESSMENT
PAIN_FUNCTIONAL_ASSESSMENT: 0-10
PAIN_FUNCTIONAL_ASSESSMENT: 0-10

## 2024-05-12 ASSESSMENT — PAIN SCALES - GENERAL
PAINLEVEL_OUTOF10: 0 - NO PAIN
PAINLEVEL_OUTOF10: 0 - NO PAIN

## 2024-05-12 NOTE — ED PROVIDER NOTES
HPI   Chief Complaint   Patient presents with   • Rash     Rash throughout body on and off since 2/18/24. Reports it has come and gone with prednisone, but new rash appeared on face today and is concerned. Denies trouble breathing but also has developed a chronic cough for one month.        Limitations to History: None    HPI: 79-year-old female presents with concern for hives.  2 separate episodes over the past 3 months.  Treated twice with steroids.  Patient is been off steroids for approximately 1 month.  Returned over the past few days.  Pruritic in nature.  Denies any fever, chills, nausea, vomiting.  Patient is also had a cough.  Denies any new environmental factors.    ------------------------------------------------------------------------------------------------------------------------------------------  Physical Exam:    VS: As documented in the triage note and EMR flowsheet from this visit were reviewed.    Appearance: Alert. cooperative,  in no acute distress.   Skin: Diffuse has on the right face, abdomen, lower and upper extremities.  Eyes: PERRLA, EOMs intact,  Conjunctiva pink with no redness or exudates.   HENT: Normocephalic, atraumatic. Nares patent. No intraoral lesions.   Neck: Supple, without meningismus. Trachea at midline. No lymphadenopathy.  Pulmonary: Clear bilaterally with good chest wall excursion. No rales, rhonchi or wheezing. No accessory muscle use or stridor.  Cardiac: Regular rate and rhythm, no rubs, murmurs, or gallops.   Abdomen: Abdomen is soft, nontender, and nondistended.  No palpable organomegaly.  No rebound or guarding.  No CVA tenderness. Nonsurgical abdomen.  Genitourinary: Exam deferred.  Musculoskeletal: Full range of motion.  Pulses full and equal. No cyanosis, clubbing, or edema.  Neurological:  Cranial nerves are grossly intact, grossly normal sensation, no weakness, no focal findings identified.  Psychiatric: Appropriate mood and affect.                             Omega Coma Scale Score: 15                     Patient History   Past Medical History:   Diagnosis Date   • Disorder of thyroid, unspecified 12/26/2021    Thyroid dysfunction     Past Surgical History:   Procedure Laterality Date   • OTHER SURGICAL HISTORY  05/16/2022    Cardiac catheterization   • OTHER SURGICAL HISTORY  05/16/2022    Ankle surgery   • OTHER SURGICAL HISTORY  05/16/2022    Tonsillectomy   • OTHER SURGICAL HISTORY  05/16/2022    Rhinoplasty   • OTHER SURGICAL HISTORY  05/16/2022    Subtotal thyroidectomy   • OTHER SURGICAL HISTORY  05/16/2022    Hysterectomy     No family history on file.  Social History     Tobacco Use   • Smoking status: Never   • Smokeless tobacco: Never   Substance Use Topics   • Alcohol use: Never   • Drug use: Never       Physical Exam   ED Triage Vitals [05/12/24 0809]   Temperature Heart Rate Respirations BP   36.3 °C (97.4 °F) 91 16 136/80      Pulse Ox Temp Source Heart Rate Source Patient Position   97 % Temporal Monitor Sitting      BP Location FiO2 (%)     Left arm --       Physical Exam    ED Course & MDM   Diagnoses as of 05/12/24 0908   Rash       Medical Decision Making  Labs Reviewed  COMPREHENSIVE METABOLIC PANEL - Abnormal     Glucose                       146 (*)                Sodium                        136                    Potassium                     4.2                    Chloride                      105                    Bicarbonate                   24                     Anion Gap                     11                     Urea Nitrogen                 22                     Creatinine                    1.31 (*)               eGFR                          41 (*)                 Calcium                       8.8                    Albumin                       3.8                    Alkaline Phosphatase          41                     Total Protein                 6.5                    AST                           18                      Bilirubin, Total              0.9                    ALT                           10                  CBC WITH AUTO DIFFERENTIAL  Medical Decision Making:    Patient appears well nontoxic.  Creatinine slightly elevated.  Chest x-ray clear.  Advised on increased p.o. hydration.  Patient has been taking Pepcid as an H2 blocker.  Patient will be given a 7-day course of prednisone as previous.  This should provide her relief until she can follow-up with allergist.  Asked return for new or worsening symptoms.  Stable at time of discharge.    Differential Diagnoses Considered: Allergic reaction, electrolyte abnormality, pneumonia, bronchitis    Independent Interpretation of Studies:  I independently interpreted: Chest x-ray shows no evidence of pneumonia or pneumothorax.    Escalation of Care:  Appropriate for discharge and follow-up with primary care and allergist.    Prescription Drug Consideration: Oral prednisone.          Procedure  Procedures     Terrell Driscoll DO  05/12/24 0908

## 2024-05-20 ENCOUNTER — HOSPITAL ENCOUNTER (OUTPATIENT)
Dept: RADIOLOGY | Facility: HOSPITAL | Age: 79
Discharge: HOME | End: 2024-05-20
Payer: MEDICARE

## 2024-05-20 DIAGNOSIS — R10.9 FLANK PAIN: ICD-10-CM

## 2024-05-20 PROCEDURE — 74176 CT ABD & PELVIS W/O CONTRAST: CPT

## 2024-05-20 PROCEDURE — 74176 CT ABD & PELVIS W/O CONTRAST: CPT | Performed by: STUDENT IN AN ORGANIZED HEALTH CARE EDUCATION/TRAINING PROGRAM

## 2024-05-31 ENCOUNTER — OFFICE VISIT (OUTPATIENT)
Dept: PRIMARY CARE | Facility: CLINIC | Age: 79
End: 2024-05-31
Payer: MEDICARE

## 2024-05-31 VITALS
BODY MASS INDEX: 23.9 KG/M2 | HEART RATE: 97 BPM | WEIGHT: 140 LBS | RESPIRATION RATE: 18 BRPM | SYSTOLIC BLOOD PRESSURE: 104 MMHG | HEIGHT: 64 IN | DIASTOLIC BLOOD PRESSURE: 62 MMHG | OXYGEN SATURATION: 97 % | TEMPERATURE: 99.4 F

## 2024-05-31 DIAGNOSIS — Z00.00 MEDICARE ANNUAL WELLNESS VISIT, SUBSEQUENT: Primary | ICD-10-CM

## 2024-05-31 DIAGNOSIS — L50.9 URTICARIA: ICD-10-CM

## 2024-05-31 DIAGNOSIS — Z00.00 ROUTINE GENERAL MEDICAL EXAMINATION AT HEALTH CARE FACILITY: ICD-10-CM

## 2024-05-31 DIAGNOSIS — N18.32 STAGE 3B CHRONIC KIDNEY DISEASE (MULTI): ICD-10-CM

## 2024-05-31 DIAGNOSIS — R05.9 COUGH, UNSPECIFIED TYPE: ICD-10-CM

## 2024-05-31 PROCEDURE — G0439 PPPS, SUBSEQ VISIT: HCPCS | Performed by: FAMILY MEDICINE

## 2024-05-31 PROCEDURE — 1159F MED LIST DOCD IN RCRD: CPT | Performed by: FAMILY MEDICINE

## 2024-05-31 PROCEDURE — 99497 ADVNCD CARE PLAN 30 MIN: CPT | Performed by: FAMILY MEDICINE

## 2024-05-31 PROCEDURE — 96372 THER/PROPH/DIAG INJ SC/IM: CPT | Performed by: FAMILY MEDICINE

## 2024-05-31 PROCEDURE — 1170F FXNL STATUS ASSESSED: CPT | Performed by: FAMILY MEDICINE

## 2024-05-31 PROCEDURE — 1123F ACP DISCUSS/DSCN MKR DOCD: CPT | Performed by: FAMILY MEDICINE

## 2024-05-31 PROCEDURE — 1158F ADVNC CARE PLAN TLK DOCD: CPT | Performed by: FAMILY MEDICINE

## 2024-05-31 PROCEDURE — 1160F RVW MEDS BY RX/DR IN RCRD: CPT | Performed by: FAMILY MEDICINE

## 2024-05-31 RX ORDER — PREDNISONE 50 MG/1
50 TABLET ORAL DAILY
Qty: 7 TABLET | Refills: 0 | Status: SHIPPED | OUTPATIENT
Start: 2024-05-31 | End: 2024-06-07

## 2024-05-31 RX ORDER — TRIAMCINOLONE ACETONIDE 40 MG/ML
80 INJECTION, SUSPENSION INTRA-ARTICULAR; INTRAMUSCULAR ONCE
Status: COMPLETED | OUTPATIENT
Start: 2024-05-31 | End: 2024-05-31

## 2024-05-31 RX ORDER — PREDNISONE 10 MG/1
10 TABLET ORAL DAILY
Qty: 30 TABLET | Refills: 0 | Status: SHIPPED | OUTPATIENT
Start: 2024-05-31

## 2024-05-31 RX ORDER — CETIRIZINE HYDROCHLORIDE 10 MG/1
10 TABLET ORAL NIGHTLY
Qty: 30 TABLET | Refills: 2 | Status: SHIPPED | OUTPATIENT
Start: 2024-05-31 | End: 2024-08-29

## 2024-05-31 RX ORDER — BENZONATATE 100 MG/1
100-200 CAPSULE ORAL 3 TIMES DAILY PRN
Qty: 60 CAPSULE | Refills: 1 | Status: SHIPPED | OUTPATIENT
Start: 2024-05-31 | End: 2024-06-30

## 2024-05-31 RX ADMIN — TRIAMCINOLONE ACETONIDE 80 MG: 40 INJECTION, SUSPENSION INTRA-ARTICULAR; INTRAMUSCULAR at 14:28

## 2024-05-31 ASSESSMENT — ENCOUNTER SYMPTOMS
DEPRESSION: 0
DYSPHORIC MOOD: 0
SINUS PAIN: 0
SORE THROAT: 0
SINUS PRESSURE: 0
DIARRHEA: 0
LOSS OF SENSATION IN FEET: 0
STRIDOR: 0
ARTHRALGIAS: 0
SEIZURES: 0
ABDOMINAL PAIN: 0
DECREASED CONCENTRATION: 0
DYSURIA: 0
NERVOUS/ANXIOUS: 0
CONFUSION: 0
FEVER: 0
AGITATION: 0
CHEST TIGHTNESS: 0
BLOOD IN STOOL: 0
MYALGIAS: 0
PALPITATIONS: 0
HEMATURIA: 0
ACTIVITY CHANGE: 0
NECK STIFFNESS: 0
RHINORRHEA: 0
ADENOPATHY: 0
SPEECH DIFFICULTY: 0
DIZZINESS: 0
FATIGUE: 0
OCCASIONAL FEELINGS OF UNSTEADINESS: 0
FLANK PAIN: 0
APPETITE CHANGE: 0
PHOTOPHOBIA: 0
TROUBLE SWALLOWING: 0
COUGH: 1
CONSTITUTIONAL NEGATIVE: 1
POLYDIPSIA: 0
EYE PAIN: 0
CONSTIPATION: 0
SLEEP DISTURBANCE: 0
SHORTNESS OF BREATH: 0
HEADACHES: 0
POLYPHAGIA: 0
ABDOMINAL DISTENTION: 0
RECTAL PAIN: 0

## 2024-05-31 ASSESSMENT — ACTIVITIES OF DAILY LIVING (ADL)
GROCERY_SHOPPING: INDEPENDENT
MANAGING_FINANCES: INDEPENDENT
BATHING: INDEPENDENT
DRESSING: INDEPENDENT
TAKING_MEDICATION: INDEPENDENT
DOING_HOUSEWORK: INDEPENDENT

## 2024-05-31 ASSESSMENT — PATIENT HEALTH QUESTIONNAIRE - PHQ9
SUM OF ALL RESPONSES TO PHQ9 QUESTIONS 1 AND 2: 0
2. FEELING DOWN, DEPRESSED OR HOPELESS: NOT AT ALL
1. LITTLE INTEREST OR PLEASURE IN DOING THINGS: NOT AT ALL

## 2024-05-31 NOTE — PATIENT INSTRUCTIONS
Follow up in 3 months    Continue current medications and therapy for chronic medical conditions.    Patient was advised importance of proper diet/nutrition in addition adequate hydration. Patient was encouraged moderate exercise program to include 30 minutes daily for 5 days of the week or 150 minutes weekly. Patient will follow-up with us as scheduled.    Complete Medicare annual wellness physical/exam    Kenalog 80 mg IM x 1    Take prednisone 50 mg daily x 7 days, then take prednisone 10 mg daily    Counseled on advanced directives/17 minutes    Start cetirizine 10 mg nightly    Obtain laboratory to include BMP, CBC and magnesium level

## 2024-05-31 NOTE — PROGRESS NOTES
Subjective   Reason for Visit: David Medina is an 79 y.o. female here for a Medicare Wellness visit.     Past Medical, Surgical, and Family History reviewed and updated in chart.         Patient states having a rash from February 18 2024. Patient states she took prednisone and the rash went away but as soon as she stopped the medication the rash came back. Patient states it is very itchy and all over her body.    Patient states having some dry cough.    Patient denies any other symptoms or concerns today.        Patient Care Team:  Jimenez Groves DO as PCP - General  Jimenez Groves DO as PCP - [employer] PCP     Review of Systems   Constitutional: Negative.  Negative for activity change, appetite change, fatigue and fever.   HENT:  Negative for congestion, dental problem, ear discharge, ear pain, mouth sores, rhinorrhea, sinus pressure, sinus pain, sore throat, tinnitus and trouble swallowing.    Eyes:  Negative for photophobia, pain and visual disturbance.   Respiratory:  Positive for cough (dry). Negative for chest tightness, shortness of breath and stridor.    Cardiovascular:  Negative for chest pain and palpitations.   Gastrointestinal:  Negative for abdominal distention, abdominal pain, blood in stool, constipation, diarrhea and rectal pain.   Endocrine: Negative for cold intolerance, heat intolerance, polydipsia, polyphagia and polyuria.   Genitourinary:  Negative for dysuria, flank pain, hematuria and urgency.   Musculoskeletal:  Negative for arthralgias, gait problem, myalgias and neck stiffness.   Skin:  Positive for color change and rash.   Allergic/Immunologic: Negative for environmental allergies and food allergies.   Neurological:  Negative for dizziness, seizures, syncope, speech difficulty and headaches.   Hematological:  Negative for adenopathy.   Psychiatric/Behavioral:  Negative for agitation, confusion, decreased concentration, dysphoric mood and sleep disturbance. The  "patient is not nervous/anxious.        Objective   Vitals:  /62 (BP Location: Right arm, Patient Position: Sitting, BP Cuff Size: Adult)   Pulse 97   Temp 37.4 °C (99.4 °F)   Resp 18   Ht 1.626 m (5' 4\")   Wt 63.5 kg (140 lb)   SpO2 97%   BMI 24.03 kg/m²       Physical Exam  Vitals reviewed.   Constitutional:       General: She is not in acute distress.     Appearance: Normal appearance. She is normal weight. She is not ill-appearing or diaphoretic.   HENT:      Head: Normocephalic.      Right Ear: Tympanic membrane and external ear normal.      Left Ear: Tympanic membrane and external ear normal.      Nose: Nose normal. No congestion.      Mouth/Throat:      Pharynx: No posterior oropharyngeal erythema.   Eyes:      General:         Right eye: No discharge.         Left eye: No discharge.      Extraocular Movements: Extraocular movements intact.      Conjunctiva/sclera: Conjunctivae normal.      Pupils: Pupils are equal, round, and reactive to light.   Cardiovascular:      Rate and Rhythm: Normal rate and regular rhythm.      Pulses: Normal pulses.      Heart sounds: Normal heart sounds. No murmur heard.  Pulmonary:      Effort: Pulmonary effort is normal. No respiratory distress.      Breath sounds: Normal breath sounds. No wheezing or rales.   Chest:      Chest wall: No tenderness.   Abdominal:      General: Abdomen is flat. Bowel sounds are normal. There is no distension.      Palpations: There is no mass.      Tenderness: There is no abdominal tenderness. There is no guarding.   Musculoskeletal:         General: No tenderness. Normal range of motion.      Cervical back: Normal range of motion and neck supple. No tenderness.      Right lower leg: No edema.      Left lower leg: No edema.   Skin:     General: Skin is dry.      Coloration: Skin is not jaundiced.      Findings: Erythema and rash (urticaria) present. No bruising.   Neurological:      General: No focal deficit present.      Mental Status: " She is alert and oriented to person, place, and time. Mental status is at baseline.      Cranial Nerves: No cranial nerve deficit.      Sensory: No sensory deficit.      Coordination: Coordination normal.      Gait: Gait normal.   Psychiatric:         Mood and Affect: Mood normal.         Thought Content: Thought content normal.         Judgment: Judgment normal.         Assessment/Plan   Problem List Items Addressed This Visit       Stage 3b chronic kidney disease (Multi)    Current Assessment & Plan     Stable/monitored          Other Visit Diagnoses       Medicare annual wellness visit, subsequent    -  Primary    Routine general medical examination at health care facility        Urticaria        Relevant Medications    predniSONE (Deltasone) 50 mg tablet    predniSONE (Deltasone) 10 mg tablet    cetirizine (ZyrTEC) 10 mg tablet    Other Relevant Orders    Basic metabolic panel    Magnesium    CBC and Auto Differential    Cough, unspecified type        Relevant Medications    benzonatate (Tessalon) 100 mg capsule    triamcinolone acetonide (Kenalog-40) injection 80 mg          Scribe Attestation  By signing my name below, IJimenez DO , Scribe   attest that this documentation has been prepared under the direction and in the presence of Jimenez Groves DO.    Provider Attestation - Scribe documentation    All medical record entries made by the Scribe were at my direction and personally dictated by me. I have reviewed the chart and agree that the record accurately reflects my personal performance of the history, physical exam, discussion and plan.

## 2024-06-02 PROBLEM — N18.32 STAGE 3B CHRONIC KIDNEY DISEASE (MULTI): Status: ACTIVE | Noted: 2024-06-02

## 2024-06-02 ASSESSMENT — ENCOUNTER SYMPTOMS: COLOR CHANGE: 1

## 2024-06-04 ENCOUNTER — CONSULT (OUTPATIENT)
Dept: ALLERGY | Facility: CLINIC | Age: 79
End: 2024-06-04
Payer: MEDICARE

## 2024-06-04 ENCOUNTER — LAB (OUTPATIENT)
Dept: LAB | Facility: LAB | Age: 79
End: 2024-06-04
Payer: MEDICARE

## 2024-06-04 VITALS
SYSTOLIC BLOOD PRESSURE: 118 MMHG | WEIGHT: 140 LBS | TEMPERATURE: 98.2 F | BODY MASS INDEX: 23.9 KG/M2 | DIASTOLIC BLOOD PRESSURE: 80 MMHG | RESPIRATION RATE: 17 BRPM | HEIGHT: 64 IN | HEART RATE: 73 BPM | OXYGEN SATURATION: 99 %

## 2024-06-04 DIAGNOSIS — L50.9 URTICARIA: ICD-10-CM

## 2024-06-04 DIAGNOSIS — Z86.39 HISTORY OF HYPOTHYROIDISM: ICD-10-CM

## 2024-06-04 DIAGNOSIS — L27.0 DRUG RASH: Primary | ICD-10-CM

## 2024-06-04 LAB
ALBUMIN SERPL BCP-MCNC: 4.4 G/DL (ref 3.4–5)
ALP SERPL-CCNC: 42 U/L (ref 33–136)
ALT SERPL W P-5'-P-CCNC: 13 U/L (ref 7–45)
ANION GAP SERPL CALC-SCNC: 13 MMOL/L (ref 10–20)
AST SERPL W P-5'-P-CCNC: 17 U/L (ref 9–39)
BILIRUB SERPL-MCNC: 0.5 MG/DL (ref 0–1.2)
BUN SERPL-MCNC: 27 MG/DL (ref 6–23)
CALCIUM SERPL-MCNC: 9.9 MG/DL (ref 8.6–10.3)
CHLORIDE SERPL-SCNC: 109 MMOL/L (ref 98–107)
CO2 SERPL-SCNC: 26 MMOL/L (ref 21–32)
CREAT SERPL-MCNC: 1.2 MG/DL (ref 0.5–1.05)
EGFRCR SERPLBLD CKD-EPI 2021: 46 ML/MIN/1.73M*2
ERYTHROCYTE [SEDIMENTATION RATE] IN BLOOD BY WESTERGREN METHOD: 1 MM/H (ref 0–30)
GLUCOSE SERPL-MCNC: 90 MG/DL (ref 74–99)
POTASSIUM SERPL-SCNC: 4.4 MMOL/L (ref 3.5–5.3)
PROT SERPL-MCNC: 6.8 G/DL (ref 6.4–8.2)
SODIUM SERPL-SCNC: 144 MMOL/L (ref 136–145)
THYROPEROXIDASE AB SERPL-ACNC: >1000 IU/ML

## 2024-06-04 PROCEDURE — 83520 IMMUNOASSAY QUANT NOS NONAB: CPT

## 2024-06-04 PROCEDURE — 36415 COLL VENOUS BLD VENIPUNCTURE: CPT

## 2024-06-04 PROCEDURE — 99214 OFFICE O/P EST MOD 30 MIN: CPT | Performed by: ALLERGY & IMMUNOLOGY

## 2024-06-04 PROCEDURE — 86038 ANTINUCLEAR ANTIBODIES: CPT

## 2024-06-04 PROCEDURE — 86376 MICROSOMAL ANTIBODY EACH: CPT

## 2024-06-04 PROCEDURE — 86352 CELL FUNCTION ASSAY W/STIM: CPT

## 2024-06-04 PROCEDURE — 86800 THYROGLOBULIN ANTIBODY: CPT

## 2024-06-04 PROCEDURE — 85652 RBC SED RATE AUTOMATED: CPT

## 2024-06-04 PROCEDURE — 80053 COMPREHEN METABOLIC PANEL: CPT

## 2024-06-04 NOTE — PATIENT INSTRUCTIONS
Topical Sarna as a calming agent  PRN oatmeal bath for comfort  Topical steroids as needed.    Continue off the Ibandronate  -Cetirizine nightly for itch  -Pepcid-gut protection and helps with hives  -OK for as needed topical steroid (cortisone 10).    Have labs and follow up in a month.  Wean the steroids as per Dr. Groves's recommendation    Follow up 3-4 weeks

## 2024-06-04 NOTE — PROGRESS NOTES
Patient ID: David Medina is a 79 y.o. female.     Chief Complaint: NPV referred by Dr. Groves  History Of Present Illness  David Medina is a 79 y.o. female with PMx pruritic rash presenting for consultation.       Took Ibandronate on a Saturday, then on Sunday after her 4th monthly dose, she broke out in this itchy rash  Has needed 4-5 doses of steroids for the itchy rash.  Just restarted another course of steroid.  Just started Zyrtec at bed and Pepcid daily  Not using a topicals  No other new medications  She is not on a blood thinner other than a baby asa once a day    Food Allergy  No    Eczema/ Atopic Dermatitis  No    Asthma  No  She has had mild dry cough with the rashvery recently. It is dry and non productive.    Rhinoconjunctivitis  Cat allergy history  Tomatoes cause congestion in the brochial tubes    Drug Allergy   This is presumed to be her first reaction    Insect Allergy   No  Local reaction to wasp sting several years ago    Infections  Patient has had chronic sinus symptoms  Has needed recurrent Kenalog injection usually in February      Review of Systems    Pertinent positives and negatives have been assessed in the HPI. All other systems have been reviewed and are negative except as noted in the HPI.    Allergies  Boniva [ibandronate]    Past Medical History  She has a past medical history of Disorder of thyroid, unspecified (12/26/2021).    Family History  No family history on file.    No history of food allergy or atopic disease in the family.    Surgical History  She has a past surgical history that includes Other surgical history (05/16/2022); Other surgical history (05/16/2022); Other surgical history (05/16/2022); Other surgical history (05/16/2022); Other surgical history (05/16/2022); and Other surgical history (05/16/2022).    Social/Environmental History  She reports that she has never smoked. She has never used smokeless tobacco. She reports that she does not drink alcohol and  does not use drugs.    MEDICATIONS  Current Outpatient Medications on File Prior to Visit   Medication Sig Dispense Refill    aspirin 81 mg EC tablet Take 1 tablet (81 mg) by mouth once daily.      atorvastatin (Lipitor) 80 mg tablet TAKE 1 TABLET BY MOUTH AT  BEDTIME 90 tablet 3    Bacillus coagulans 250 million cell tablet,chewable Chew.      benzonatate (Tessalon) 100 mg capsule Take 1-2 capsules (100-200 mg) by mouth 3 times a day as needed for cough. Do not crush or chew. 60 capsule 1    cetirizine (ZyrTEC) 10 mg tablet Take 1 tablet (10 mg) by mouth once daily at bedtime. 30 tablet 2    cholecalciferol (Vitamin D3) 50 MCG (2000 UT) tablet Take 1 tablet (50 mcg) by mouth once daily.      coenzyme Q-10 (Co Q-10) 100 mg capsule Take 1 capsule (100 mg) by mouth once daily.      coffee/theanine/superoxide dis (NEURIVA DE-STRESS ORAL) Take by mouth.      cranberry fruit extract (CRANBERRY CONCENTRATE ORAL) Take by mouth.      diphenhydrAMINE (Sominex) 25 mg tablet Take 1 tablet (25 mg) by mouth as needed at bedtime for sleep.      docusate sodium (Colace) 100 mg capsule Take 1 capsule (100 mg) by mouth 2 times a day.      ezetimibe (Zetia) 10 mg tablet TAKE 1 TABLET DAILY 90 tablet 3    famotidine (Pepcid) 40 mg tablet Take 1 tablet (40 mg) by mouth as needed at bedtime for heartburn. 90 tablet 1    ibandronate (Boniva) 150 mg tablet Take 1 tablet (150 mg) by mouth every 30 (thirty) days. Take in morning with full glass of water on an empty stomach. No food, drink, meds, or lying down for 60 minutes after. 4 tablet 3    levothyroxine (Synthroid, Levoxyl) 100 mcg tablet Take 1 tablet (100 mcg) by mouth once daily in the morning. Take before meals. 90 tablet 1    magnesium 250 mg tablet Take 1 tablet (250 mg) by mouth once daily.      meloxicam (Mobic) 7.5 mg tablet TAKE 1 TABLET DAILY WITH FOOD 90 tablet 0    montelukast (Singulair) 10 mg tablet TAKE 1 TABLET BY MOUTH ONCE  DAILY 90 tablet 1     "multivit-min/iron/FA/vit K/lut (CENTRUM SILVER WOMEN ORAL) Take by mouth.      nitroglycerin (Nitrostat) 0.4 mg SL tablet PLACE 1 TABLET (0.4 MG) UNDER THE TONGUE EVERY 5 MINUTES IF NEEDED FOR CHEST PAIN. MAY REPEAT EVERY 5 MINUTES FOR UP TO 3 DOSES. 25 tablet 1    oxymetazoline (Vicks Sinex 12-Hour) 0.05 % nasal spray Administer into each nostril. Do not use for more than 3 days.      predniSONE (Deltasone) 10 mg tablet Take 1 tablet (10 mg) by mouth once daily. 30 tablet 0    predniSONE (Deltasone) 50 mg tablet Take 1 tablet (50 mg) by mouth once daily for 7 days. 7 tablet 0    red yeast rice 600 mg capsule Take by mouth.      urea/lactic acid/propylene gly (KERASAL MULTI-PURPOSE NAIL REP TOP) Apply topically.       No current facility-administered medications on file prior to visit.         Physical Exam  Visit Vitals  /80   Pulse 73   Temp 36.8 °C (98.2 °F) (Temporal)   Resp 17   Ht 1.626 m (5' 4\")   Wt 63.5 kg (140 lb)   SpO2 99%   BMI 24.03 kg/m²   Smoking Status Never   BSA 1.69 m²       Wt Readings from Last 1 Encounters:   06/04/24 63.5 kg (140 lb)       Physical Exam    General: Well appearing, no acute distress  Head: Normocephalic, atraumatic, neck supple without lymphadenopathy  Eyes: EOMI, non-injected  Nose: No nasal crease, nares patent, slightly boggy turbinates, minimal discharge  Throat: Normal dentition, no erythema  Heart: Regular rate and rhythm  Lungs: Clear to auscultation bilaterally, effort normal  Abdomen: Soft, non-tender, normal bowel sounds  Extremities: Moves all extremities symmetrically, no edema  Skin: Diffuse flat hyperpigmented lesions extremities and trunk. Patient has photos of hives on her extremities and trunk.  Psych: normal mood and affect    LAB RESULTS:  CBC:  Recent Labs     05/12/24  0830 01/15/24  0958 11/12/22  0840   WBC 6.8 7.9 7.1   HGB 12.6 12.2 11.8*   HCT 39.0 38.0 36.6    283 261   MCV 90 89 87   EOSABS 0.06 0.68* 0.57*       CMP:  Recent Labs     " 05/12/24  0830 01/15/24  0958 02/27/23  1120    139 141   K 4.2 4.7 4.7    106 106   CO2 24 27 30   ANIONGAP 11 11 10   BUN 22 20 22   CREATININE 1.31* 0.99 1.09*   EGFR 41* 58*  --      Recent Labs     05/12/24  0830 01/15/24  0958 02/27/23  1120   ALBUMIN 3.8 4.5 4.5   ALKPHOS 41 52 72   ALT 10 19 23   AST 18 25 27   BILITOT 0.9 0.7 0.7     Recent Labs     05/12/24  0830 01/15/24  0958 11/12/22  0840   EOSABS 0.06 0.68* 0.57*         HEME/ENDO:  Recent Labs     01/15/24  0958 08/02/23  0817 02/27/23  1120   TSH 1.01 0.07* 0.06*         Assessment/Plan   78 yo woman with a history of pruritic, hive like rash that has persisted since her last dose of Ibandronate which was in early February 2024. She also has a history of hypothyroidism. She has required several steroid courses.  Antihistamine has helped the itch, but as steroids wean, she develops hives again.  We discussed that this could be due to the new medication or combination of things. A skin biopsy could be considered if not improving on therapy.  I will obtain labs and call results  Plan follow up one month.  Symptomatic care discussed.    Kayleen Saeed DO

## 2024-06-05 NOTE — PROGRESS NOTES
Patient presents to the office today for a 1 MO F/U w/CT(05/20/2024)....Results showed no acute findings in the abdomen and pelvis...Colonic diverticulosis without diverticulitis Hx of Right Flank Pain and Hematuria... LUTs are chronic and mild. Denies frequency and urgency. Denies dysuria and hematuria.. Nocturia x1.. Caffeine does worsen LUTs.. No medications for LUTs.. No Hx of Kidney Stones... Hx of UTI's...(years ago) No Recent Imaging...

## 2024-06-06 LAB
ANA PATTERN: ABNORMAL
ANA SER QL HEP2 SUBST: POSITIVE
ANA TITR SER IF: ABNORMAL {TITER}
THYROGLOB AB SERPL-ACNC: 3.1 IU/ML (ref 0–4)
TRYPTASE SERPL-MCNC: 15.8 UG/L

## 2024-06-10 ENCOUNTER — OFFICE VISIT (OUTPATIENT)
Dept: UROLOGY | Facility: CLINIC | Age: 79
End: 2024-06-10
Payer: MEDICARE

## 2024-06-10 VITALS
SYSTOLIC BLOOD PRESSURE: 144 MMHG | BODY MASS INDEX: 24.59 KG/M2 | DIASTOLIC BLOOD PRESSURE: 86 MMHG | HEART RATE: 94 BPM | WEIGHT: 144 LBS | HEIGHT: 64 IN

## 2024-06-10 DIAGNOSIS — R31.9 HEMATURIA, UNSPECIFIED TYPE: Primary | ICD-10-CM

## 2024-06-10 PROCEDURE — 1036F TOBACCO NON-USER: CPT | Performed by: STUDENT IN AN ORGANIZED HEALTH CARE EDUCATION/TRAINING PROGRAM

## 2024-06-10 PROCEDURE — 99214 OFFICE O/P EST MOD 30 MIN: CPT | Performed by: STUDENT IN AN ORGANIZED HEALTH CARE EDUCATION/TRAINING PROGRAM

## 2024-06-10 ASSESSMENT — ENCOUNTER SYMPTOMS
OCCASIONAL FEELINGS OF UNSTEADINESS: 0
LOSS OF SENSATION IN FEET: 0
DEPRESSION: 0

## 2024-06-10 NOTE — PROGRESS NOTES
"Subjective   Patient ID: David Medina \"Tea\" is a 79 y.o. female    [facility]  79 y.o. female who presents to the office today for a 1 MO F/U w/CT(05/20/2024)....Results showed no acute findings in the abdomen and pelvis...Colonic diverticulosis without diverticulitis Hx of Right Flank Pain and Hematuria... LUTs are chronic and mild. Denies frequency and urgency. Denies dysuria and hematuria.. Nocturia x1.. Caffeine does worsen LUTs.. No medications for LUTs.. No Hx of Kidney Stones... Hx of UTI's...(years ago) No Recent Imaging...        Review of Systems    All systems were reviewed. Anything negative was noted in the HPI.    Objective   Physical Exam    General: Well developed, well nourished, alert and cooperative, appears in no acute distress   Eyes: Non-injected conjunctiva, sclera clear, no proptosis   Cardiac: Extremities are warm and well perfused. No edema, cyanosis or pallor   Lungs: Breathing is easy, non-labored. Speaking in clear and complete sentences. Normal diaphragmatic movement   MSK: Ambulatory with steady gait, unassisted   Neuro: Alert and oriented to person, place, and time   Psych: Demonstrates good judgment and reason, without hallucinations, abnormal affect or abnormal behaviors   Skin: No obvious lesions, no rashes       No CVA tenderness bilaterally   No suprapubic pain or discomfort       Past Medical History:   Diagnosis Date    Disorder of thyroid, unspecified 12/26/2021    Thyroid dysfunction         Past Surgical History:   Procedure Laterality Date    OTHER SURGICAL HISTORY  05/16/2022    Cardiac catheterization    OTHER SURGICAL HISTORY  05/16/2022    Ankle surgery    OTHER SURGICAL HISTORY  05/16/2022    Tonsillectomy    OTHER SURGICAL HISTORY  05/16/2022    Rhinoplasty    OTHER SURGICAL HISTORY  05/16/2022    Subtotal thyroidectomy    OTHER SURGICAL HISTORY  05/16/2022    Hysterectomy           Assessment/Plan   Left flank pain with LUTs    79 y.o. female who presents for the above " condition, We had a very long and extensive discussion with the patient regarding the pathophysiology, differential diagnosis, risk factor, management, natural history, incidence and diagnostic work-up of the condition.      Plan:  - Follow up in 1 year        6/10/2024    Scrjose maria Attestation  By signing my name below, Jose BOBBY Scribe   attest that this documentation has been prepared under the direction and in the presence of Dr. Vikas Espinoza

## 2024-06-20 ENCOUNTER — TELEPHONE (OUTPATIENT)
Dept: ALLERGY | Facility: CLINIC | Age: 79
End: 2024-06-20
Payer: MEDICARE

## 2024-06-20 LAB — URTICARIA-INDUCING ACTIVITY: 40.5 INDEX UNIT

## 2024-06-20 NOTE — TELEPHONE ENCOUNTER
Occupational Therapy  Visit Type: initial evaluation  Precautions:  Medical precautions:  fall risk; standard precautions.    Lines:     Basic: O2 and capped IV (2L)      Lines in chart and on patient reviewed, precautions maintained throughout session.  Safety Measures: chair alarm    SUBJECTIVE  Patient agreed to participate in therapy this date.  Patient / Family Goal: return home    Pain     At onset of session (out of 10): 0    OBJECTIVE   Level of consciousness: alert    Oriented to person, place and time   Hand Dominance: right  Range of Motion (measured in degrees unless otherwise indicated)  WNL: LUE, RUE  Strength (out of 5 unless otherwise indicated)  5/5: LUE, RUE  Finger/Thumb:  Gross :  strength grossly equal bilateral  Balance (trials in sec unless otherwise indicated)  Sitting:   - Static:  independent    - Dynamic:  stand by assist  Standing - Firm Surface - Eyes Open:    - Static: stand by assist   - Dynamic:  contact guard/touching/steadying assist    Sensation - Upper Extremity  C4 (shoulder, clavicular and upper scapular areas):     Light Touch: Left: intact Right: intact  C5 (deltoid area, anterior aspect of entire arm to base of thumb):     Light Touch: Left: intact Right: intact  C6 (anterior upper extremity, radial side of hand to 1st and 2nd digit):     Light Touch: Left: intact Right: intact  C7 (lateral upper extremity and forearm to 2nd through 4th digit):     Light Touch: Left: intact Right: intact  C8 (medial upper extremity and forearm to 3rd through 5th):     Light Touch: Left: intact Right: intact  T1 (medial side of forearm to base of 5th digit):    Light Touch: Left: intact Right: intact  Coordination  Gross Motor:  LUE: grossly intact  RUE: grossly intact  Fine Motor:  LUE: grossly intact RUE: grossly intact    Transfers:    Assistive devices: gait belt and 2-wheeled walker    Sit to stand: stand by assist and with verbal cues    Stand to sit: stand by assist and with  Discussed abnormal labs  Recommend seeing dermatology due to abnormal labs for skin biopsy evaluation.  She is on prednisone daily right now per Dr. Groves and her rash is decreased. Has follow up here planned in a month.   verbal cues    Functional Ambulation:    Assistance:stand by assist   Assistive device:2-wheeled walker    Distance (ft): 10;10    Activities of Daily Living (ADLs):  Eating:     Assist: independent  Grooming/Oral Hygiene:     Grooming assist: stand by assist    Oral hygiene assist: stand by assist    Position: standing at sink  Upper Body Dressing:    Assist: set up  Lower Body Dressing:     Assist: set up and with verbal cues  Toilet transfer:     Assist: stand by assist and with verbal cues    Device: gait belt and 2-wheeled walker    Equipment: bedside commode  Toileting:     Assist: contact guard/touching/steadying assist  Tub Transfer:     Assist: contact guard/touching/steadying assist (simulated with 2UE support )  Bathing:     Assist: stand by assist    Position: chair      Interventions    Training provided: ADL training, activity tolerance, transfer training, safety training, balance retraining and compensatory techniquesRehab Safety  Therapist donned the following PPE for this patient encounter:  Gloves, Surgical Mask and Face Shield    Therapy aide or other healthcare professional present during this patient encounter:   No  If yes, that healthcare professional wore the following PPE: Not Applicable    The patient was wearing a mask during this session:  No      Skilled input: verbal instruction/cues, posture correction and tactile instruction/cues  Verbal Consent: Writer verbally educated and received verbal consent for hand placement, positioning of patient, and techniques to be performed today from patient for clothing adjustments for techniques and therapist position for techniques as described above and how they are pertinent to the patient's plan of care.        ASSESSMENT    Impairments: activity tolerance, balance and safety awareness  Functional Limitations: functional mobility, grooming, bathing, toileting, functional transfers, dressing and showering  Personal Occupations Profile Affected:  bathing/showering, personal hygiene/grooming, functional mobility/transfers, toileting/toilet hygiene, lower body dressing, upper body dressing  Skilled therapy is required to address these limitations in attempt to maximize the patient's independence.  Clinical decision making: Low - Patient has few limitations (1-3), comorbidities and/or complexities, as noted in problem focused assessment noted above, that impact their occupational profile.  Resulting in few treatment options and no task modification consistent with low clinical decision making complexity.    End of Session:   Location: in chair  Safety measures: alarm system in place/re-engaged, call light within reach, equipment intact and lines intact  Handoff to: nurse  Education Provided:   Learning assessment:  - Primary learner: patient  - Are they ready to learn: yes  - Preferred learning style: verbal  - Barriers to learning: language  Education provided during session:  - Receiving education: patient  - Pt has recent fall with hitting head, pt informed sitting upright with no bending forward for ADLs  - Results of above outlined education: Demonstrates understanding and Verbalizes understanding    PLAN    Interventions: activity tolerance training, ADL retraining, balance, compensatory techniques, compensatory technique education, energy conservation, functional transfer training, safety training, transfer training, positioning and patient education  Agreement to plan and goals: patient agrees with goals and treatment plan      GOALS  Long Term Goals: (to be met by time of discharge from hospital)  Grooming: Patient will complete grooming tasks in standing modified independent.  Upper body dressing: Patient will complete upper body dressing independent.  Lower body dressing: Patient will complete lower body dressing modified independent.  Toileting: Patient will complete toileting modified independent.  Bathing: Patient will complete bathingmodified  independent Toilet transfer: Patient will complete toilet transfer with modified independent.   Tub/shower transfer: Patient will complete tub/shower transfer with supervision.         Documented in the chart in the following areas:  Services. Prior Level of Function. Assessment. Plan.      Therapy procedure time and total treatment time can be found documented on the Time Entry flowsheet

## 2024-06-23 DIAGNOSIS — L50.9 URTICARIA: ICD-10-CM

## 2024-06-24 RX ORDER — CETIRIZINE HYDROCHLORIDE 10 MG/1
10 TABLET ORAL NIGHTLY
Qty: 90 TABLET | Refills: 0 | Status: SHIPPED | OUTPATIENT
Start: 2024-06-24 | End: 2024-09-22

## 2024-06-25 DIAGNOSIS — E03.9 ACQUIRED HYPOTHYROIDISM: ICD-10-CM

## 2024-06-26 ENCOUNTER — APPOINTMENT (OUTPATIENT)
Dept: ALLERGY | Facility: CLINIC | Age: 79
End: 2024-06-26
Payer: MEDICARE

## 2024-06-26 VITALS
OXYGEN SATURATION: 96 % | BODY MASS INDEX: 23.39 KG/M2 | DIASTOLIC BLOOD PRESSURE: 78 MMHG | HEART RATE: 72 BPM | HEIGHT: 64 IN | RESPIRATION RATE: 17 BRPM | TEMPERATURE: 97.8 F | WEIGHT: 137 LBS | SYSTOLIC BLOOD PRESSURE: 136 MMHG

## 2024-06-26 DIAGNOSIS — R74.8 ELEVATED SERUM TRYPTASE: Primary | ICD-10-CM

## 2024-06-26 DIAGNOSIS — L50.9 URTICARIA: ICD-10-CM

## 2024-06-26 PROCEDURE — 99214 OFFICE O/P EST MOD 30 MIN: CPT | Performed by: ALLERGY & IMMUNOLOGY

## 2024-06-26 PROCEDURE — 1159F MED LIST DOCD IN RCRD: CPT | Performed by: ALLERGY & IMMUNOLOGY

## 2024-06-26 RX ORDER — PREDNISONE 10 MG/1
10 TABLET ORAL DAILY
Qty: 30 TABLET | Refills: 0 | Status: SHIPPED | OUTPATIENT
Start: 2024-06-26

## 2024-06-26 RX ORDER — LEVOTHYROXINE SODIUM 100 UG/1
100 TABLET ORAL
Qty: 90 TABLET | Refills: 1 | Status: SHIPPED | OUTPATIENT
Start: 2024-06-26

## 2024-06-26 NOTE — PROGRESS NOTES
"Patient ID: David Medina \"Dorothy" is a 79 y.o. female.     Chief Complaint: follow up labs  History Of Present Illness  David Medina \"Dorothy" is a 79 y.o. female with PMx rash and itching presenting for follow up labs.     Has derm appointment with Dr. Jack on July 29.  Still on prednisone, so no rash at this time  On Cetirizine once at bedtime.  She is also on Famotidine once a day.  No topicals at this time      Review of Systems    Pertinent positives and negatives have been assessed in the HPI. All other systems have been reviewed and are negative except as noted in the HPI.    Allergies  Boniva [ibandronate]    Past Medical History  She has a past medical history of Disorder of thyroid, unspecified (12/26/2021).    Family History  No family history on file.        Surgical History  She has a past surgical history that includes Other surgical history (05/16/2022); Other surgical history (05/16/2022); Other surgical history (05/16/2022); Other surgical history (05/16/2022); Other surgical history (05/16/2022); and Other surgical history (05/16/2022).    Social/Environmental History  She reports that she has never smoked. She has never used smokeless tobacco. She reports that she does not drink alcohol and does not use drugs.        MEDICATIONS  Current Outpatient Medications on File Prior to Visit   Medication Sig Dispense Refill    aspirin 81 mg EC tablet Take 1 tablet (81 mg) by mouth once daily.      atorvastatin (Lipitor) 80 mg tablet TAKE 1 TABLET BY MOUTH AT  BEDTIME 90 tablet 3    Bacillus coagulans 250 million cell tablet,chewable Chew.      benzonatate (Tessalon) 100 mg capsule Take 1-2 capsules (100-200 mg) by mouth 3 times a day as needed for cough. Do not crush or chew. 60 capsule 1    cetirizine (ZyrTEC) 10 mg tablet TAKE 1 TABLET (10 MG) BY MOUTH ONCE DAILY AT BEDTIME. 90 tablet 0    cholecalciferol (Vitamin D3) 50 MCG (2000 UT) tablet Take 1 tablet (50 mcg) by mouth once daily.      coenzyme " Q-10 (Co Q-10) 100 mg capsule Take 1 capsule (100 mg) by mouth once daily.      coffee/theanine/superoxide dis (NEURIVA DE-STRESS ORAL) Take by mouth.      cranberry fruit extract (CRANBERRY CONCENTRATE ORAL) Take by mouth.      diphenhydrAMINE (Sominex) 25 mg tablet Take 1 tablet (25 mg) by mouth as needed at bedtime for sleep.      docusate sodium (Colace) 100 mg capsule Take 1 capsule (100 mg) by mouth 2 times a day.      ezetimibe (Zetia) 10 mg tablet TAKE 1 TABLET DAILY 90 tablet 3    famotidine (Pepcid) 40 mg tablet Take 1 tablet (40 mg) by mouth as needed at bedtime for heartburn. 90 tablet 1    ibandronate (Boniva) 150 mg tablet Take 1 tablet (150 mg) by mouth every 30 (thirty) days. Take in morning with full glass of water on an empty stomach. No food, drink, meds, or lying down for 60 minutes after. 4 tablet 3    levothyroxine (Synthroid, Levoxyl) 100 mcg tablet Take 1 tablet (100 mcg) by mouth once daily in the morning. Take before meals. 90 tablet 1    magnesium 250 mg tablet Take 1 tablet (250 mg) by mouth once daily.      meloxicam (Mobic) 7.5 mg tablet TAKE 1 TABLET DAILY WITH FOOD 90 tablet 0    montelukast (Singulair) 10 mg tablet TAKE 1 TABLET BY MOUTH ONCE  DAILY 90 tablet 1    multivit-min/iron/FA/vit K/lut (CENTRUM SILVER WOMEN ORAL) Take by mouth.      nitroglycerin (Nitrostat) 0.4 mg SL tablet PLACE 1 TABLET (0.4 MG) UNDER THE TONGUE EVERY 5 MINUTES IF NEEDED FOR CHEST PAIN. MAY REPEAT EVERY 5 MINUTES FOR UP TO 3 DOSES. 25 tablet 1    oxymetazoline (Vicks Sinex 12-Hour) 0.05 % nasal spray Administer into each nostril. Do not use for more than 3 days.      predniSONE (Deltasone) 10 mg tablet Take 1 tablet (10 mg) by mouth once daily. 30 tablet 0    red yeast rice 600 mg capsule Take by mouth.      urea/lactic acid/propylene gly (KERASAL MULTI-PURPOSE NAIL REP TOP) Apply topically.      [DISCONTINUED] cetirizine (ZyrTEC) 10 mg tablet Take 1 tablet (10 mg) by mouth once daily at bedtime. 30  "tablet 2     No current facility-administered medications on file prior to visit.         Physical Exam  Visit Vitals  /78   Pulse 72   Temp 36.6 °C (97.8 °F) (Temporal)   Resp 17   Ht 1.626 m (5' 4\")   Wt 62.1 kg (137 lb)   SpO2 96%   BMI 23.52 kg/m²   Smoking Status Never   BSA 1.67 m²       Wt Readings from Last 1 Encounters:   06/26/24 62.1 kg (137 lb)       Physical Exam    General: Well appearing, no acute distress  Head: Normocephalic, atraumatic,   Eyes: non-injected  Nose: No nasal crease, nares patent  Lungs: effort normal  Extremities: no edema  Skin: No rashes/lesions  Psych: normal mood and affect    LAB RESULTS:  CBC:  Recent Labs     05/12/24  0830 01/15/24  0958 11/12/22  0840   WBC 6.8 7.9 7.1   HGB 12.6 12.2 11.8*   HCT 39.0 38.0 36.6    283 261   MCV 90 89 87   EOSABS 0.06 0.68* 0.57*       CMP:  Recent Labs     06/04/24  1146 05/12/24  0830 01/15/24  0958    136 139   K 4.4 4.2 4.7   * 105 106   CO2 26 24 27   ANIONGAP 13 11 11   BUN 27* 22 20   CREATININE 1.20* 1.31* 0.99   EGFR 46* 41* 58*     Recent Labs     06/04/24  1146 05/12/24  0830 01/15/24  0958   ALBUMIN 4.4 3.8 4.5   ALKPHOS 42 41 52   ALT 13 10 19   AST 17 18 25   BILITOT 0.5 0.9 0.7       Recent Labs     05/12/24  0830 01/15/24  0958 11/12/22  0840   EOSABS 0.06 0.68* 0.57*     Recent Labs     06/04/24  1146   TRYPTASE 15.8*       HEME/ENDO:  Recent Labs     01/15/24  0958 08/02/23  0817 02/27/23  1120   TSH 1.01 0.07* 0.06*         Assessment/Plan   Aretta \"Tea\" is an 78 yo woman with a history of chronic pruritic rash and hives. 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 We discussed these labs today and intend to follow up.  She has no hives or rash because she is on prednisone at this time. She is also taking Cetirizine and Famotidine daily.  Patient also plans follow up with Dr. Groves for thyroid monitoring.    I have ordered ckit. She is also going to see Dr. Jack " in dermatology.    I will have her follow up in August.    Kayleen Saeed,

## 2024-07-09 ENCOUNTER — APPOINTMENT (OUTPATIENT)
Dept: CARDIOLOGY | Facility: CLINIC | Age: 79
End: 2024-07-09
Payer: MEDICARE

## 2024-07-09 VITALS
HEIGHT: 64 IN | HEART RATE: 74 BPM | DIASTOLIC BLOOD PRESSURE: 74 MMHG | BODY MASS INDEX: 23.9 KG/M2 | SYSTOLIC BLOOD PRESSURE: 130 MMHG | WEIGHT: 140 LBS

## 2024-07-09 DIAGNOSIS — I25.10 CORONARY ARTERY DISEASE INVOLVING NATIVE CORONARY ARTERY OF NATIVE HEART, UNSPECIFIED WHETHER ANGINA PRESENT: Primary | ICD-10-CM

## 2024-07-09 DIAGNOSIS — N18.31 STAGE 3A CHRONIC KIDNEY DISEASE (MULTI): ICD-10-CM

## 2024-07-09 DIAGNOSIS — E78.00 HYPERCHOLESTEROLEMIA: ICD-10-CM

## 2024-07-09 DIAGNOSIS — Z78.9 NEVER SMOKED TOBACCO: ICD-10-CM

## 2024-07-09 PROCEDURE — 99213 OFFICE O/P EST LOW 20 MIN: CPT | Performed by: INTERNAL MEDICINE

## 2024-07-09 PROCEDURE — 1036F TOBACCO NON-USER: CPT | Performed by: INTERNAL MEDICINE

## 2024-07-09 PROCEDURE — 1159F MED LIST DOCD IN RCRD: CPT | Performed by: INTERNAL MEDICINE

## 2024-07-09 RX ORDER — BENZONATATE 100 MG/1
100 CAPSULE ORAL 3 TIMES DAILY PRN
COMMUNITY

## 2024-07-09 ASSESSMENT — ENCOUNTER SYMPTOMS: DIZZINESS: 1

## 2024-07-09 NOTE — PROGRESS NOTES
"Subjective   David Medina is a 79 y.o. female       Chief Complaint    Follow-up          HPI   Patient is in the office for follow-up for the problems noted below.  Since her last visit a year ago she has had no cardiac events whatsoever.  Last lipid profile from few months ago was under excellent control on a combination of maximal dose atorvastatin plus ezetimibe.  She has mild arthritis in her hands which is inconsequential.  She is not known to be diabetic hypertensive or smoker.  Her carotid disease which has been proven to be mild in the past apparently has not been progressive and she maintains active lifestyle.    ASSESSMENT / recommendations.     1. Minimal coronary artery disease by cardiac catheter in 2007, at this time there was 30% LAD lesion. 2018 stress test and echocardiogram were normal, risk factors have been controlled  2-hyperlipidemia on atorvastatin 80 mg daily plus Zetia 10 mg daily. LDL just above 70 mg/dL.  3-status post partial thyroidectomy, currently on supplement but is hyperthyroid and her PCP is adjusting her medications.  4-stage IIIa chronic kidney disease, being monitored     Madison Gallego MD, FACC   Review of Systems   Cardiovascular:  Positive for chest pain and leg swelling.   Neurological:  Positive for dizziness.   All other systems reviewed and are negative.           Vitals:    07/09/24 1002   BP: 130/74   BP Location: Right arm   Patient Position: Sitting   Pulse: 74   Weight: 63.5 kg (140 lb)   Height: 1.626 m (5' 4\")        Objective   Physical Exam  Constitutional:       Appearance: Normal appearance.   HENT:      Nose: Nose normal.   Neck:      Vascular: No carotid bruit.   Cardiovascular:      Rate and Rhythm: Normal rate.      Pulses: Normal pulses.      Heart sounds: Normal heart sounds.   Pulmonary:      Effort: Pulmonary effort is normal.   Abdominal:      General: Bowel sounds are normal.      Palpations: Abdomen is soft.   Musculoskeletal:         " General: Normal range of motion.      Cervical back: Normal range of motion.      Right lower leg: No edema.      Left lower leg: No edema.   Skin:     General: Skin is warm and dry.   Neurological:      General: No focal deficit present.      Mental Status: She is alert.   Psychiatric:         Mood and Affect: Mood normal.         Behavior: Behavior normal.         Thought Content: Thought content normal.         Judgment: Judgment normal.         Allergies  Boniva [ibandronate]     Current Medications    Current Outpatient Medications:     aspirin 81 mg EC tablet, Take 1 tablet (81 mg) by mouth once daily., Disp: , Rfl:     atorvastatin (Lipitor) 80 mg tablet, TAKE 1 TABLET BY MOUTH AT  BEDTIME, Disp: 90 tablet, Rfl: 3    Bacillus coagulans 250 million cell tablet,chewable, Chew., Disp: , Rfl:     benzonatate (Tessalon) 100 mg capsule, Take 1 capsule (100 mg) by mouth 3 times a day as needed for cough. Do not crush or chew., Disp: , Rfl:     cetirizine (ZyrTEC) 10 mg tablet, TAKE 1 TABLET (10 MG) BY MOUTH ONCE DAILY AT BEDTIME., Disp: 90 tablet, Rfl: 0    cholecalciferol (Vitamin D3) 50 MCG (2000 UT) tablet, Take 1 tablet (50 mcg) by mouth once daily., Disp: , Rfl:     coenzyme Q-10 (Co Q-10) 100 mg capsule, Take 1 capsule (100 mg) by mouth once daily., Disp: , Rfl:     coffee/theanine/superoxide dis (NEURIVA DE-STRESS ORAL), Take by mouth., Disp: , Rfl:     cranberry fruit extract (CRANBERRY CONCENTRATE ORAL), Take by mouth., Disp: , Rfl:     diphenhydrAMINE (Sominex) 25 mg tablet, Take 1 tablet (25 mg) by mouth as needed at bedtime for sleep., Disp: , Rfl:     docusate sodium (Colace) 100 mg capsule, Take 1 capsule (100 mg) by mouth 2 times a day., Disp: , Rfl:     ezetimibe (Zetia) 10 mg tablet, TAKE 1 TABLET DAILY, Disp: 90 tablet, Rfl: 3    famotidine (Pepcid) 40 mg tablet, Take 1 tablet (40 mg) by mouth as needed at bedtime for heartburn., Disp: 90 tablet, Rfl: 1    levothyroxine (Synthroid, Levoxyl) 100 mcg  tablet, TAKE 1 TABLET BY MOUTH ONCE  DAILY IN THE MORNING BEFORE A  MEAL, Disp: 90 tablet, Rfl: 1    magnesium 250 mg tablet, Take 1 tablet (250 mg) by mouth once daily., Disp: , Rfl:     meloxicam (Mobic) 7.5 mg tablet, TAKE 1 TABLET DAILY WITH FOOD, Disp: 90 tablet, Rfl: 0    montelukast (Singulair) 10 mg tablet, TAKE 1 TABLET BY MOUTH ONCE  DAILY, Disp: 90 tablet, Rfl: 1    multivit-min/iron/FA/vit K/lut (CENTRUM SILVER WOMEN ORAL), Take by mouth., Disp: , Rfl:     nitroglycerin (Nitrostat) 0.4 mg SL tablet, PLACE 1 TABLET (0.4 MG) UNDER THE TONGUE EVERY 5 MINUTES IF NEEDED FOR CHEST PAIN. MAY REPEAT EVERY 5 MINUTES FOR UP TO 3 DOSES., Disp: 25 tablet, Rfl: 1    oxymetazoline (Vicks Sinex 12-Hour) 0.05 % nasal spray, Administer into each nostril. Do not use for more than 3 days., Disp: , Rfl:     predniSONE (Deltasone) 10 mg tablet, Take 1 tablet (10 mg) by mouth once daily., Disp: 30 tablet, Rfl: 0    red yeast rice 600 mg capsule, Take by mouth., Disp: , Rfl:     urea/lactic acid/propylene gly (KERASAL MULTI-PURPOSE NAIL REP TOP), Apply topically., Disp: , Rfl:                      Assessment/Plan   1. Coronary artery disease involving native coronary artery of native heart, unspecified whether angina present        2. Hypercholesterolemia        3. Sinus tachycardia        4. Stage 3a chronic kidney disease (Multi)        5. BMI 24.0-24.9, adult        6. Never smoked tobacco                 Scribe Attestation  By signing my name below, Malu BOBBY LPN, Scribe   attest that this documentation has been prepared under the direction and in the presence of Madison Gallego MD.     Provider Attestation - Scribe documentation    All medical record entries made by the Scribe were at my direction and personally dictated by me. I have reviewed the chart and agree that the record accurately reflects my personal performance of the history, physical exam, discussion and plan.

## 2024-07-09 NOTE — LETTER
July 9, 2024     Jimenez Groves DO  [address] [place] [address]    Patient: Tea Medina   YOB: 1945   Date of Visit: 7/9/2024       Dear Dr. Jimenez Groves DO:    Thank you for referring Tea Medina to me for evaluation. Below are my notes for this consultation.  If you have questions, please do not hesitate to call me. I look forward to following your patient along with you.       Sincerely,     Madison Gallego MD      CC: No Recipients  ______________________________________________________________________________________    Subjective   David Medina is a 79 y.o. female       Chief Complaint    Follow-up          HPI   Patient is in the office for follow-up for the problems noted below.  Since her last visit a year ago she has had no cardiac events whatsoever.  Last lipid profile from few months ago was under excellent control on a combination of maximal dose atorvastatin plus ezetimibe.  She has mild arthritis in her hands which is inconsequential.  She is not known to be diabetic hypertensive or smoker.  Her carotid disease which has been proven to be mild in the past apparently has not been progressive and she maintains active lifestyle.    ASSESSMENT / recommendations.     1. Minimal coronary artery disease by cardiac catheter in 2007, at this time there was 30% LAD lesion. 2018 stress test and echocardiogram were normal, risk factors have been controlled  2-hyperlipidemia on atorvastatin 80 mg daily plus Zetia 10 mg daily. LDL just above 70 mg/dL.  3-status post partial thyroidectomy, currently on supplement but is hyperthyroid and her PCP is adjusting her medications.  4-stage IIIa chronic kidney disease, being monitored     Madison Gallego MD, [facility]   Review of Systems   Cardiovascular:  Positive for chest pain and leg swelling.   Neurological:  Positive for dizziness.   All other systems reviewed and are negative.           Vitals:    07/09/24 1002   BP: 130/74   BP  "Location: Right arm   Patient Position: Sitting   Pulse: 74   Weight: 63.5 kg (140 lb)   Height: 1.626 m (5' 4\")        Objective   Physical Exam  Constitutional:       Appearance: Normal appearance.   HENT:      Nose: Nose normal.   Neck:      Vascular: No carotid bruit.   Cardiovascular:      Rate and Rhythm: Normal rate.      Pulses: Normal pulses.      Heart sounds: Normal heart sounds.   Pulmonary:      Effort: Pulmonary effort is normal.   Abdominal:      General: Bowel sounds are normal.      Palpations: Abdomen is soft.   Musculoskeletal:         General: Normal range of motion.      Cervical back: Normal range of motion.      Right lower leg: No edema.      Left lower leg: No edema.   Skin:     General: Skin is warm and dry.   Neurological:      General: No focal deficit present.      Mental Status: She is alert.   Psychiatric:         Mood and Affect: Mood normal.         Behavior: Behavior normal.         Thought Content: Thought content normal.         Judgment: Judgment normal.         Allergies  Boniva [ibandronate]     Current Medications    Current Outpatient Medications:   •  aspirin 81 mg EC tablet, Take 1 tablet (81 mg) by mouth once daily., Disp: , Rfl:   •  atorvastatin (Lipitor) 80 mg tablet, TAKE 1 TABLET BY MOUTH AT  BEDTIME, Disp: 90 tablet, Rfl: 3  •  Bacillus coagulans 250 million cell tablet,chewable, Chew., Disp: , Rfl:   •  benzonatate (Tessalon) 100 mg capsule, Take 1 capsule (100 mg) by mouth 3 times a day as needed for cough. Do not crush or chew., Disp: , Rfl:   •  cetirizine (ZyrTEC) 10 mg tablet, TAKE 1 TABLET (10 MG) BY MOUTH ONCE DAILY AT BEDTIME., Disp: 90 tablet, Rfl: 0  •  cholecalciferol (Vitamin D3) 50 MCG (2000 UT) tablet, Take 1 tablet (50 mcg) by mouth once daily., Disp: , Rfl:   •  coenzyme Q-10 (Co Q-10) 100 mg capsule, Take 1 capsule (100 mg) by mouth once daily., Disp: , Rfl:   •  coffee/theanine/superoxide dis (NEURIVA DE-STRESS ORAL), Take by mouth., Disp: , Rfl: "   •  cranberry fruit extract (CRANBERRY CONCENTRATE ORAL), Take by mouth., Disp: , Rfl:   •  diphenhydrAMINE (Sominex) 25 mg tablet, Take 1 tablet (25 mg) by mouth as needed at bedtime for sleep., Disp: , Rfl:   •  docusate sodium (Colace) 100 mg capsule, Take 1 capsule (100 mg) by mouth 2 times a day., Disp: , Rfl:   •  ezetimibe (Zetia) 10 mg tablet, TAKE 1 TABLET DAILY, Disp: 90 tablet, Rfl: 3  •  famotidine (Pepcid) 40 mg tablet, Take 1 tablet (40 mg) by mouth as needed at bedtime for heartburn., Disp: 90 tablet, Rfl: 1  •  levothyroxine (Synthroid, Levoxyl) 100 mcg tablet, TAKE 1 TABLET BY MOUTH ONCE  DAILY IN THE MORNING BEFORE A  MEAL, Disp: 90 tablet, Rfl: 1  •  magnesium 250 mg tablet, Take 1 tablet (250 mg) by mouth once daily., Disp: , Rfl:   •  meloxicam (Mobic) 7.5 mg tablet, TAKE 1 TABLET DAILY WITH FOOD, Disp: 90 tablet, Rfl: 0  •  montelukast (Singulair) 10 mg tablet, TAKE 1 TABLET BY MOUTH ONCE  DAILY, Disp: 90 tablet, Rfl: 1  •  multivit-min/iron/FA/vit K/lut (CENTRUM SILVER WOMEN ORAL), Take by mouth., Disp: , Rfl:   •  nitroglycerin (Nitrostat) 0.4 mg SL tablet, PLACE 1 TABLET (0.4 MG) UNDER THE TONGUE EVERY 5 MINUTES IF NEEDED FOR CHEST PAIN. MAY REPEAT EVERY 5 MINUTES FOR UP TO 3 DOSES., Disp: 25 tablet, Rfl: 1  •  oxymetazoline (Vicks Sinex 12-Hour) 0.05 % nasal spray, Administer into each nostril. Do not use for more than 3 days., Disp: , Rfl:   •  predniSONE (Deltasone) 10 mg tablet, Take 1 tablet (10 mg) by mouth once daily., Disp: 30 tablet, Rfl: 0  •  red yeast rice 600 mg capsule, Take by mouth., Disp: , Rfl:   •  urea/lactic acid/propylene gly (KERASAL MULTI-PURPOSE NAIL REP TOP), Apply topically., Disp: , Rfl:                      Assessment/Plan   1. Coronary artery disease involving native coronary artery of native heart, unspecified whether angina present        2. Hypercholesterolemia        3. Sinus tachycardia        4. Stage 3a chronic kidney disease (Multi)        5. BMI  24.0-24.9, adult        6. Never smoked tobacco                 Scribe Attestation  By signing my name below, IMalu LPN, Scribe   attest that this documentation has been prepared under the direction and in the presence of Madison Gallego MD.     Provider Attestation - Scribe documentation    All medical record entries made by the Scribe were at my direction and personally dictated by me. I have reviewed the chart and agree that the record accurately reflects my personal performance of the history, physical exam, discussion and plan.

## 2024-07-16 ENCOUNTER — APPOINTMENT (OUTPATIENT)
Dept: ALLERGY | Facility: CLINIC | Age: 79
End: 2024-07-16
Payer: MEDICARE

## 2024-07-29 ENCOUNTER — APPOINTMENT (OUTPATIENT)
Dept: DERMATOLOGY | Facility: CLINIC | Age: 79
End: 2024-07-29
Payer: MEDICARE

## 2024-07-29 DIAGNOSIS — R21 RASH AND OTHER NONSPECIFIC SKIN ERUPTION: Primary | ICD-10-CM

## 2024-07-29 PROCEDURE — 1159F MED LIST DOCD IN RCRD: CPT | Performed by: STUDENT IN AN ORGANIZED HEALTH CARE EDUCATION/TRAINING PROGRAM

## 2024-07-29 PROCEDURE — 11104 PUNCH BX SKIN SINGLE LESION: CPT | Performed by: STUDENT IN AN ORGANIZED HEALTH CARE EDUCATION/TRAINING PROGRAM

## 2024-07-29 PROCEDURE — 11105 PUNCH BX SKIN EA SEP/ADDL: CPT | Performed by: STUDENT IN AN ORGANIZED HEALTH CARE EDUCATION/TRAINING PROGRAM

## 2024-07-29 PROCEDURE — 99203 OFFICE O/P NEW LOW 30 MIN: CPT | Performed by: STUDENT IN AN ORGANIZED HEALTH CARE EDUCATION/TRAINING PROGRAM

## 2024-07-29 NOTE — PROGRESS NOTES
Subjective     Tea Medina is a 79 y.o. female who presents for the following: Rash (Itchy, red, raised rash to arms, legs, torso. Present since Feb 18th, 2024. Started after taking Boniva, began after 4th dose (once dose per month). Has been taking Benadryl, cetrizine and Rx'd prednisone daily (last dose was last weds) only controlled on steroids, then returns. Stopped taking all medication so rash would erupt for Bx. ).     Review of Systems:  No other skin or systemic complaints other than what is documented elsewhere in the note.    The following portions of the chart were reviewed this encounter and updated as appropriate:          Skin Cancer History  No skin cancer on file.      Specialty Problems    None       Objective   Well appearing patient in no apparent distress; mood and affect are within normal limits.    A focused skin examination was performed. All findings within normal limits unless otherwise noted below.    Assessment/Plan   1. Rash and other nonspecific skin eruption  Generalized, Right Abdomen (side) - Lower, Right Flank  Pink urticarial plaques with residual PIH   Trunk and proximal legs  Discussed likely diagnosis of urticaria  Taking pepcid, zyrtec, and singulair  Present since Feb  Will biopsy for H&E and DIF today to r/o urticarial vasculitis and urticarial BP  Rash resolves with prednisone  Patient declines use of high dose antihistamines  Will discuss results and recommendations with Allergist once received          Skin biopsy - Right Abdomen (side) - Lower  Type of biopsy: punch    Informed consent: discussed and consent obtained    Timeout: patient name, date of birth, surgical site, and procedure verified    Procedure prep:  Patient was prepped and draped  Anesthesia: the lesion was anesthetized in a standard fashion    Anesthetic:  1% lidocaine w/ epinephrine 1-100,000 local infiltration  Punch size:  4 mm  Suture size:  5-0  Suture type: fast-absorbing plain gut    Hemostasis  achieved with: suture    Outcome: patient tolerated procedure well    Post-procedure details: sterile dressing applied and wound care instructions given    Dressing type: bandage and petrolatum      Skin biopsy - Right Flank  Type of biopsy: punch    Informed consent: discussed and consent obtained    Timeout: patient name, date of birth, surgical site, and procedure verified    Procedure prep:  Patient was prepped and draped  Anesthesia: the lesion was anesthetized in a standard fashion    Anesthetic:  1% lidocaine w/ epinephrine 1-100,000 local infiltration  Punch size:  4 mm  Suture size:  5-0  Suture type: fast-absorbing plain gut    Hemostasis achieved with: suture    Outcome: patient tolerated procedure well    Post-procedure details: sterile dressing applied and wound care instructions given    Dressing type: bandage and petrolatum      Specimen 2 - Dermatopathology- DERM LAB  Differential Diagnosis: urticaria vs urticarial vasculitis vs urticarial BP  Check Margins Yes/No?:    Comments:    Dermpath Lab: Routine Histopathology (formalin-fixed tissue)    Specimen 1 - Dermatopathology- DERM LAB  Differential Diagnosis: urticarial BP vs urticarial vasculitis   Check Margins Yes/No?:    Comments:    Dermpath Lab: Direct Immunofluorescence (specimen in [facility] media)

## 2024-07-30 DIAGNOSIS — L50.8 CHRONIC URTICARIA: Primary | ICD-10-CM

## 2024-07-30 RX ORDER — PREDNISONE 10 MG/1
10 TABLET ORAL EVERY MORNING
Qty: 14 TABLET | Refills: 0 | Status: SHIPPED | OUTPATIENT
Start: 2024-07-30 | End: 2024-08-13

## 2024-07-31 ENCOUNTER — TELEPHONE (OUTPATIENT)
Dept: PRIMARY CARE | Facility: CLINIC | Age: 79
End: 2024-07-31
Payer: MEDICARE

## 2024-07-31 DIAGNOSIS — N18.31 STAGE 3A CHRONIC KIDNEY DISEASE (MULTI): ICD-10-CM

## 2024-07-31 DIAGNOSIS — E03.9 ACQUIRED HYPOTHYROIDISM: ICD-10-CM

## 2024-07-31 DIAGNOSIS — E78.5 DYSLIPIDEMIA: ICD-10-CM

## 2024-07-31 DIAGNOSIS — R53.83 FATIGUE, UNSPECIFIED TYPE: ICD-10-CM

## 2024-07-31 NOTE — TELEPHONE ENCOUNTER
Pt would like lab orders placed to check thyroid again, please place order and any routine orders BESS would like her to have done prior to appt 8/09.

## 2024-08-01 ENCOUNTER — LAB (OUTPATIENT)
Dept: LAB | Facility: LAB | Age: 79
End: 2024-08-01
Payer: MEDICARE

## 2024-08-01 DIAGNOSIS — R53.83 FATIGUE, UNSPECIFIED TYPE: ICD-10-CM

## 2024-08-01 DIAGNOSIS — E78.5 DYSLIPIDEMIA: ICD-10-CM

## 2024-08-01 DIAGNOSIS — N18.31 STAGE 3A CHRONIC KIDNEY DISEASE (MULTI): ICD-10-CM

## 2024-08-01 DIAGNOSIS — E03.9 ACQUIRED HYPOTHYROIDISM: ICD-10-CM

## 2024-08-01 DIAGNOSIS — R25.2 LEG CRAMPS: ICD-10-CM

## 2024-08-01 DIAGNOSIS — L50.9 URTICARIA: ICD-10-CM

## 2024-08-01 DIAGNOSIS — R74.8 ELEVATED SERUM TRYPTASE: ICD-10-CM

## 2024-08-01 LAB
ACANTHOCYTES BLD QL SMEAR: ABNORMAL
ALBUMIN SERPL BCP-MCNC: 4.2 G/DL (ref 3.4–5)
ALP SERPL-CCNC: 40 U/L (ref 33–136)
ALT SERPL W P-5'-P-CCNC: 18 U/L (ref 7–45)
ANION GAP SERPL CALC-SCNC: 11 MMOL/L (ref 10–20)
ANION GAP SERPL CALC-SCNC: 13 MMOL/L (ref 10–20)
AST SERPL W P-5'-P-CCNC: 22 U/L (ref 9–39)
BASOPHILS # BLD MANUAL: 0 X10*3/UL (ref 0–0.1)
BASOPHILS NFR BLD MANUAL: 0 %
BILIRUB SERPL-MCNC: 0.5 MG/DL (ref 0–1.2)
BUN SERPL-MCNC: 26 MG/DL (ref 6–23)
BUN SERPL-MCNC: 26 MG/DL (ref 6–23)
CALCIUM SERPL-MCNC: 9.3 MG/DL (ref 8.6–10.3)
CALCIUM SERPL-MCNC: 9.4 MG/DL (ref 8.6–10.3)
CHLORIDE SERPL-SCNC: 107 MMOL/L (ref 98–107)
CHLORIDE SERPL-SCNC: 107 MMOL/L (ref 98–107)
CHOLEST SERPL-MCNC: 155 MG/DL (ref 0–199)
CHOLESTEROL/HDL RATIO: 2.5
CO2 SERPL-SCNC: 26 MMOL/L (ref 21–32)
CO2 SERPL-SCNC: 28 MMOL/L (ref 21–32)
CREAT SERPL-MCNC: 1.28 MG/DL (ref 0.5–1.05)
CREAT SERPL-MCNC: 1.36 MG/DL (ref 0.5–1.05)
EGFRCR SERPLBLD CKD-EPI 2021: 39 ML/MIN/1.73M*2
EGFRCR SERPLBLD CKD-EPI 2021: 42 ML/MIN/1.73M*2
EOSINOPHIL # BLD MANUAL: 0.09 X10*3/UL (ref 0–0.4)
EOSINOPHIL NFR BLD MANUAL: 1 %
ERYTHROCYTE [DISTWIDTH] IN BLOOD BY AUTOMATED COUNT: 13.9 % (ref 11.5–14.5)
GLUCOSE SERPL-MCNC: 86 MG/DL (ref 74–99)
GLUCOSE SERPL-MCNC: 86 MG/DL (ref 74–99)
HCT VFR BLD AUTO: 36.9 % (ref 36–46)
HDLC SERPL-MCNC: 61 MG/DL
HGB BLD-MCNC: 11.7 G/DL (ref 12–16)
IMM GRANULOCYTES # BLD AUTO: 0.05 X10*3/UL (ref 0–0.5)
IMM GRANULOCYTES NFR BLD AUTO: 0.6 % (ref 0–0.9)
LDLC SERPL CALC-MCNC: 68 MG/DL
LYMPHOCYTES # BLD MANUAL: 3.56 X10*3/UL (ref 0.8–3)
LYMPHOCYTES NFR BLD MANUAL: 40 %
MAGNESIUM SERPL-MCNC: 2.35 MG/DL (ref 1.6–2.4)
MAGNESIUM SERPL-MCNC: 2.4 MG/DL (ref 1.6–2.4)
MCH RBC QN AUTO: 29.2 PG (ref 26–34)
MCHC RBC AUTO-ENTMCNC: 31.7 G/DL (ref 32–36)
MCV RBC AUTO: 92 FL (ref 80–100)
METAMYELOCYTES # BLD MANUAL: 0.27 X10*3/UL
METAMYELOCYTES NFR BLD MANUAL: 3 %
MONOCYTES # BLD MANUAL: 0.62 X10*3/UL (ref 0.05–0.8)
MONOCYTES NFR BLD MANUAL: 7 %
NEUTS SEG # BLD MANUAL: 4.18 X10*3/UL (ref 1.6–5)
NEUTS SEG NFR BLD MANUAL: 47 %
NON HDL CHOLESTEROL: 94 MG/DL (ref 0–149)
NRBC BLD-RTO: 0 /100 WBCS (ref 0–0)
PLATELET # BLD AUTO: 224 X10*3/UL (ref 150–450)
POTASSIUM SERPL-SCNC: 4.5 MMOL/L (ref 3.5–5.3)
POTASSIUM SERPL-SCNC: 4.5 MMOL/L (ref 3.5–5.3)
PROT SERPL-MCNC: 6.3 G/DL (ref 6.4–8.2)
RBC # BLD AUTO: 4.01 X10*6/UL (ref 4–5.2)
RBC MORPH BLD: ABNORMAL
SCHISTOCYTES BLD QL SMEAR: ABNORMAL
SODIUM SERPL-SCNC: 141 MMOL/L (ref 136–145)
SODIUM SERPL-SCNC: 141 MMOL/L (ref 136–145)
TOTAL CELLS COUNTED BLD: 100
TRIGL SERPL-MCNC: 132 MG/DL (ref 0–149)
TSH SERPL-ACNC: 2.59 MIU/L (ref 0.44–3.98)
VARIANT LYMPHS # BLD MANUAL: 0.18 X10*3/UL (ref 0–0.3)
VARIANT LYMPHS NFR BLD: 2 %
VLDL: 26 MG/DL (ref 0–40)
WBC # BLD AUTO: 8.9 X10*3/UL (ref 4.4–11.3)

## 2024-08-01 PROCEDURE — 36415 COLL VENOUS BLD VENIPUNCTURE: CPT

## 2024-08-01 PROCEDURE — 81273 KIT GENE ANALYS D816 VARIANT: CPT

## 2024-08-01 PROCEDURE — 80061 LIPID PANEL: CPT

## 2024-08-01 PROCEDURE — 80053 COMPREHEN METABOLIC PANEL: CPT

## 2024-08-01 PROCEDURE — 84479 ASSAY OF THYROID (T3 OR T4): CPT

## 2024-08-01 PROCEDURE — 86376 MICROSOMAL ANTIBODY EACH: CPT

## 2024-08-01 PROCEDURE — 85007 BL SMEAR W/DIFF WBC COUNT: CPT

## 2024-08-01 PROCEDURE — 84443 ASSAY THYROID STIM HORMONE: CPT

## 2024-08-01 PROCEDURE — 80048 BASIC METABOLIC PNL TOTAL CA: CPT

## 2024-08-01 PROCEDURE — 84436 ASSAY OF TOTAL THYROXINE: CPT

## 2024-08-01 PROCEDURE — 83735 ASSAY OF MAGNESIUM: CPT

## 2024-08-01 PROCEDURE — 85027 COMPLETE CBC AUTOMATED: CPT

## 2024-08-02 LAB
FT4I SERPL CALC-MCNC: 3.1 (ref 1.6–4.7)
T3RU NFR SERPL: 34 % (ref 24–41)
T4 SERPL-MCNC: 9.2 UG/DL (ref 4.5–11.1)
THYROPEROXIDASE AB SERPL-ACNC: 987 IU/ML

## 2024-08-06 LAB
ELECTRONICALLY SIGNED BY: NORMAL
KIT D816V INTERPRETATION: NORMAL
KIT D816V MUTATION RESULTS: NOT DETECTED

## 2024-08-07 LAB
LAB AP ASR DISCLAIMER: NORMAL
LABORATORY COMMENT REPORT: NORMAL
PATH REPORT.FINAL DX SPEC: NORMAL
PATH REPORT.GROSS SPEC: NORMAL
PATH REPORT.MICROSCOPIC SPEC OTHER STN: NORMAL
PATH REPORT.RELEVANT HX SPEC: NORMAL
PATH REPORT.TOTAL CANCER: NORMAL

## 2024-08-09 ENCOUNTER — APPOINTMENT (OUTPATIENT)
Dept: PRIMARY CARE | Facility: CLINIC | Age: 79
End: 2024-08-09
Payer: MEDICARE

## 2024-08-09 VITALS
DIASTOLIC BLOOD PRESSURE: 81 MMHG | HEART RATE: 86 BPM | TEMPERATURE: 97.9 F | SYSTOLIC BLOOD PRESSURE: 113 MMHG | WEIGHT: 139 LBS | BODY MASS INDEX: 23.86 KG/M2

## 2024-08-09 DIAGNOSIS — N18.31 STAGE 3A CHRONIC KIDNEY DISEASE (MULTI): ICD-10-CM

## 2024-08-09 PROCEDURE — 99442 PR PHYS/QHP TELEPHONE EVALUATION 11-20 MIN: CPT | Performed by: FAMILY MEDICINE

## 2024-08-09 ASSESSMENT — ENCOUNTER SYMPTOMS
MYALGIAS: 0
FEVER: 0
EYE PAIN: 0
SEIZURES: 0
SINUS PAIN: 0
SHORTNESS OF BREATH: 0
PALPITATIONS: 0
DYSPHORIC MOOD: 0
AGITATION: 0
DECREASED CONCENTRATION: 0
ABDOMINAL PAIN: 0
SLEEP DISTURBANCE: 0
ACTIVITY CHANGE: 0
DIARRHEA: 0
POLYDIPSIA: 0
PHOTOPHOBIA: 0
BLOOD IN STOOL: 0
SINUS PRESSURE: 0
ARTHRALGIAS: 0
RECTAL PAIN: 0
STRIDOR: 0
POLYPHAGIA: 0
DIZZINESS: 0
CHEST TIGHTNESS: 0
NERVOUS/ANXIOUS: 0
ABDOMINAL DISTENTION: 0
SORE THROAT: 0
CONSTITUTIONAL NEGATIVE: 1
ADENOPATHY: 0
COUGH: 0
APPETITE CHANGE: 0
CONSTIPATION: 0
FLANK PAIN: 0
CONFUSION: 0
NECK STIFFNESS: 0
HEADACHES: 0
HEMATURIA: 0
RHINORRHEA: 0
SPEECH DIFFICULTY: 0
DYSURIA: 0
FATIGUE: 0
COLOR CHANGE: 0
TROUBLE SWALLOWING: 0

## 2024-08-09 NOTE — PATIENT INSTRUCTIONS
Follow up in 4 months    Continue current medications and therapy for chronic medical conditions.    Patient was advised importance of proper diet/nutrition in addition adequate hydration. Patient was encouraged moderate exercise program to include 30 minutes daily for 5 days of the week or 150 minutes weekly. Patient will follow-up with us as scheduled.    Review lab results to include pathology from August 2024    Discontinue mobic d/t decreased kidney function    Obtain BMP lab September 2024    Tylenol every 4 hours as needed, not to exceed 3000 mg/day    Follow-up with nephrology as scheduled

## 2024-08-09 NOTE — PROGRESS NOTES
Subjective   Patient ID: Tea Medina is a 79 y.o. female who presents for Results.    Consent obtained by Tea Medina for audio communication. Total time for this audio communication visit is 12 minutes.     Patient presents today to follow up on recent lab results from 08/01/2024.     Patient is currently on prednisone and benadryl and states this is working well to control her body rash but when she is off the medications, the rash does return. Had dermatopathology 07/29/2024.  Sees allergy and immunity Dr. Brady, follow up scheduled 08/30/2024.     Glucose today is 111         Review of Systems   Constitutional: Negative.  Negative for activity change, appetite change, fatigue and fever.   HENT:  Negative for congestion, dental problem, ear discharge, ear pain, mouth sores, rhinorrhea, sinus pressure, sinus pain, sore throat, tinnitus and trouble swallowing.    Eyes:  Negative for photophobia, pain and visual disturbance.   Respiratory:  Negative for cough, chest tightness, shortness of breath and stridor.    Cardiovascular:  Negative for chest pain and palpitations.   Gastrointestinal:  Negative for abdominal distention, abdominal pain, blood in stool, constipation, diarrhea and rectal pain.   Endocrine: Negative for cold intolerance, heat intolerance, polydipsia, polyphagia and polyuria.   Genitourinary:  Negative for dysuria, flank pain, hematuria and urgency.   Musculoskeletal:  Negative for arthralgias, gait problem, myalgias and neck stiffness.   Skin:  Positive for rash. Negative for color change.   Allergic/Immunologic: Negative for environmental allergies and food allergies.   Neurological:  Negative for dizziness, seizures, syncope, speech difficulty and headaches.   Hematological:  Negative for adenopathy.   Psychiatric/Behavioral:  Negative for agitation, confusion, decreased concentration, dysphoric mood and sleep disturbance. The patient is not nervous/anxious.        Objective   /81    Pulse 86   Temp 36.6 °C (97.9 °F)   Wt 63 kg (139 lb)   BMI 23.86 kg/m²     Physical Exam  Constitutional: Well developed, well nourished, alert and in no acute distress   Psychiatric: Mood calm and affect normal    Telephone Visit - Audio Communication Only      Assessment/Plan   Problem List Items Addressed This Visit             ICD-10-CM    Stage 3a chronic kidney disease (Multi) N18.31    Relevant Orders    Basic metabolic panel    Follow Up In [facility] - PCP - Established     Scribe Attestation  By signing my name below, I, Jimenez Groves DO , Kimberlyibceleste   attest that this documentation has been prepared under the direction and in the presence of Jimenez Groves DO.    Provider Attestation - Scribe documentation    All medical record entries made by the Scribe were at my direction and personally dictated by me. I have reviewed the chart and agree that the record accurately reflects my personal performance of the history, physical exam, discussion and plan.

## 2024-08-14 DIAGNOSIS — L50.9 URTICARIA: ICD-10-CM

## 2024-08-14 RX ORDER — CETIRIZINE HYDROCHLORIDE 10 MG/1
10 TABLET ORAL EVERY 12 HOURS
Qty: 180 TABLET | Refills: 3 | Status: SHIPPED | OUTPATIENT
Start: 2024-08-14

## 2024-08-15 NOTE — RESULT ENCOUNTER NOTE
Dr. Jack spoke with patient and informed her of two biopsy results:  1.Lower Right Abdomen-Negative direct immunofluorescence to all reactants used.  2. Right Flank- Mild Superficial Perivascular and Neutrophilic Infiltrate.  Dr. Jack noted results were consistent with urticaria.

## 2024-08-20 ENCOUNTER — APPOINTMENT (OUTPATIENT)
Dept: ALLERGY | Facility: CLINIC | Age: 79
End: 2024-08-20
Payer: MEDICARE

## 2024-08-30 ENCOUNTER — APPOINTMENT (OUTPATIENT)
Dept: ALLERGY | Facility: CLINIC | Age: 79
End: 2024-08-30
Payer: MEDICARE

## 2024-08-30 VITALS
HEART RATE: 77 BPM | TEMPERATURE: 98.1 F | WEIGHT: 141 LBS | BODY MASS INDEX: 24.07 KG/M2 | RESPIRATION RATE: 17 BRPM | SYSTOLIC BLOOD PRESSURE: 136 MMHG | OXYGEN SATURATION: 96 % | HEIGHT: 64 IN | DIASTOLIC BLOOD PRESSURE: 80 MMHG

## 2024-08-30 DIAGNOSIS — L95.8 URTICARIAL VASCULITIS: Primary | ICD-10-CM

## 2024-08-30 DIAGNOSIS — E06.3 HASHIMOTO'S DISEASE: ICD-10-CM

## 2024-08-30 PROCEDURE — 1159F MED LIST DOCD IN RCRD: CPT | Performed by: ALLERGY & IMMUNOLOGY

## 2024-08-30 PROCEDURE — 99214 OFFICE O/P EST MOD 30 MIN: CPT | Performed by: ALLERGY & IMMUNOLOGY

## 2024-08-30 RX ORDER — PREDNISONE 2.5 MG/1
7.5 TABLET ORAL DAILY
Qty: 42 TABLET | Refills: 0 | Status: SHIPPED | OUTPATIENT
Start: 2024-08-30 | End: 2024-09-13

## 2024-08-30 RX ORDER — DAPSONE 25 MG/1
25 TABLET ORAL NIGHTLY
Qty: 90 TABLET | Refills: 0 | Status: SHIPPED | OUTPATIENT
Start: 2024-08-30 | End: 2024-11-28

## 2024-08-30 RX ORDER — PREDNISONE 5 MG/1
5 TABLET ORAL DAILY
Qty: 30 TABLET | Refills: 1 | Status: SHIPPED | OUTPATIENT
Start: 2024-08-30 | End: 2024-10-29

## 2024-08-30 NOTE — PROGRESS NOTES
"Patient ID: David Medina \"Tea\" is a 79 y.o. female.     Chief Complaint: follow up  History Of Present Illness  David Medina \"Dorothy" is a 79 y.o. female with PMx hives with elevated tryptase presenting for follow up.   Current routine for meds:  Prednisone 10 mg in the morning and one benadryl 25 mg and one famotidine 20 mg.  One Benadryl (25 mg) at lunch and at dinner  Cetirizine 10 mg at night.  Nor breaking out as long as she is on her prednisone.  Right now feels like her arms are very faintly ready to break out.    Did see Dr. Jack.  BX done and reviewed.    The trigger is believed to have been the Boniva. The skin biopsy did not completely rule out vasculitic component.    Does have a history of thyroid disease.      Review of Systems    Pertinent positives and negatives have been assessed in the HPI. All other systems have been reviewed and are negative except as noted in the HPI.    Allergies  Boniva [ibandronate]    Past Medical History  She has a past medical history of Disorder of thyroid, unspecified (12/26/2021).    Family History  No family history on file.    Surgical History  She has a past surgical history that includes Other surgical history (05/16/2022); Other surgical history (05/16/2022); Other surgical history (05/16/2022); Other surgical history (05/16/2022); Other surgical history (05/16/2022); and Other surgical history (05/16/2022).    Social/Environmental History  She reports that she has never smoked. She has never used smokeless tobacco. She reports that she does not drink alcohol and does not use drugs.      MEDICATIONS  Current Outpatient Medications on File Prior to Visit   Medication Sig Dispense Refill    aspirin 81 mg EC tablet Take 1 tablet (81 mg) by mouth once daily.      atorvastatin (Lipitor) 80 mg tablet TAKE 1 TABLET BY MOUTH AT  BEDTIME 90 tablet 3    Bacillus coagulans 250 million cell tablet,chewable Chew.      benzonatate (Tessalon) 100 mg capsule Take 1 " capsule (100 mg) by mouth 3 times a day as needed for cough. Do not crush or chew.      cetirizine (ZyrTEC) 10 mg tablet Take 1 tablet (10 mg) by mouth every 12 hours. 180 tablet 3    cholecalciferol (Vitamin D3) 50 MCG (2000 UT) tablet Take 1 tablet (50 mcg) by mouth once daily.      coenzyme Q-10 (Co Q-10) 100 mg capsule Take 1 capsule (100 mg) by mouth once daily.      coffee/theanine/superoxide dis (NEURIVA DE-STRESS ORAL) Take by mouth.      cranberry fruit extract (CRANBERRY CONCENTRATE ORAL) Take by mouth.      diphenhydrAMINE (Sominex) 25 mg tablet Take 1 tablet (25 mg) by mouth as needed at bedtime for sleep.      docusate sodium (Colace) 100 mg capsule Take 1 capsule (100 mg) by mouth 2 times a day.      ezetimibe (Zetia) 10 mg tablet TAKE 1 TABLET DAILY 90 tablet 3    famotidine (Pepcid) 40 mg tablet Take 1 tablet (40 mg) by mouth as needed at bedtime for heartburn. 90 tablet 1    levothyroxine (Synthroid, Levoxyl) 100 mcg tablet TAKE 1 TABLET BY MOUTH ONCE  DAILY IN THE MORNING BEFORE A  MEAL 90 tablet 1    magnesium 250 mg tablet Take 1 tablet (250 mg) by mouth once daily.      montelukast (Singulair) 10 mg tablet TAKE 1 TABLET BY MOUTH ONCE  DAILY 90 tablet 1    multivit-min/iron/FA/vit K/lut (CENTRUM SILVER WOMEN ORAL) Take by mouth.      nitroglycerin (Nitrostat) 0.4 mg SL tablet PLACE 1 TABLET (0.4 MG) UNDER THE TONGUE EVERY 5 MINUTES IF NEEDED FOR CHEST PAIN. MAY REPEAT EVERY 5 MINUTES FOR UP TO 3 DOSES. 25 tablet 1    oxymetazoline (Vicks Sinex 12-Hour) 0.05 % nasal spray Administer into each nostril. Do not use for more than 3 days.      predniSONE (Deltasone) 10 mg tablet Take 1 tablet (10 mg) by mouth once daily. 30 tablet 0    red yeast rice 600 mg capsule Take by mouth.      urea/lactic acid/propylene gly (KERASAL MULTI-PURPOSE NAIL REP TOP) Apply topically.       No current facility-administered medications on file prior to visit.         Physical Exam  Visit Vitals  Resp 17   Ht 1.626 m (5'  "4\")   Wt 64 kg (141 lb)   BMI 24.20 kg/m²   Smoking Status Never   BSA 1.7 m²       Wt Readings from Last 1 Encounters:   08/30/24 64 kg (141 lb)       Physical Exam    General: Well appearing, no acute distress  Head: Normocephalic, atraumatic  Eyes: non-injected  Nose: No nasal crease, nares patent  Lungs:  effort normal  Abdomen: Soft, healing scar from biopsy X2  Extremities: Moves all extremities symmetrically, no edema  Skin: faint erythema on the forearms.  Psych: normal mood and affect    LAB RESULTS:  CBC:  Recent Labs     08/01/24  0816 05/12/24  0830 01/15/24  0958   WBC 8.9 6.8 7.9   HGB 11.7* 12.6 12.2   HCT 36.9 39.0 38.0    323 283   MCV 92 90 89   EOSABS 0.09 0.06 0.68*       CMP:  Recent Labs     08/01/24  1722 08/01/24  0816 06/04/24  1146 05/12/24  0830   NA  --  141  141 144 136   K  --  4.5  4.5 4.4 4.2   CL  --  107  107 109* 105   CO2  --  26  28 26 24   ANIONGAP  --  13  11 13 11   BUN  --  26*  26* 27* 22   CREATININE  --  1.36*  1.28* 1.20* 1.31*   EGFR  --  39*  42* 46* 41*   MG 2.35 2.40  --   --      Recent Labs     08/01/24  0816 06/04/24  1146 05/12/24  0830   ALBUMIN 4.2 4.4 3.8   ALKPHOS 40 42 41   ALT 18 13 10   AST 22 17 18   BILITOT 0.5 0.5 0.9       Recent Labs     08/01/24  0816 05/12/24  0830 01/15/24  0958   EOSABS 0.09 0.06 0.68*     Recent Labs     06/04/24  1146   TRYPTASE 15.8*         HEME/ENDO:  Recent Labs     08/01/24  0816 01/15/24  0958 08/02/23  0817   TSH 2.59 1.01 0.07*   June 29, 2024:  A. SKIN DIF, RIGHT ABDOMEN (SIDE) - LOWER, PUNCH BIOPSY (DIF):  NEGATIVE DIRECT IMMUNOFLUORESCENCE TO ALL REACTANTS USED.     B. SKIN, RIGHT FLANK, PUNCH BIOPSY:  MILD SUPERFICIAL PERIVASCULAR AND NEUTROPHILIC INFILTRATE, SEE NOTE.     Note: Microscopic examination reveals a specimen that extends into the deep reticular dermis. There is a mild superficial neutrophilic infiltrate with neutrophils in blood vessels walls. Significant erythrocyte extravasation or " fibrinoid degeneration of blood vessel walls are not identified.      These findings could be seen in an infection, neurophilic urticaria or a neutrophilic urticarial dermatosis, which may be seen in association with autoimmune and autoinflammatory conditions. Urticarial vasculitis cannot be excluded as the histologic findings in this entity may be subtle.       **Electronically signed out by LOAN NICOLE MD**    Assessment/Plan   78 yo woman with history of rash, itching and elevated Tryptase. Urticaria primary diagnosis. Component of vasculitis cannot be completely ruled out. Occurred after Boniva treatment.  Thyroid disease history.  -Attempt further wean of steroid  -Adjust medications to include Dapsone at bedtime low dose  -additional labs  -follow up 6 wks.  Kayleen Saeed,

## 2024-08-30 NOTE — PATIENT INSTRUCTIONS
Cetirizine 10 mg in the morning as well as 7.5 mg prednisone and famotidine 20 mg.-do this for the next week until new prednisone arrives (5 mg) tablets and if able, wean from the 7.5 mg to the 5 mg.  OK for lunch and dinner Benadryl if needed.  Start Dapsone 25 mg at bed.    Have labs.    Follow up 6 wks. Call with concerns if any arise prior to your next appointment.

## 2024-09-03 ENCOUNTER — LAB (OUTPATIENT)
Dept: LAB | Facility: LAB | Age: 79
End: 2024-09-03
Payer: MEDICARE

## 2024-09-03 DIAGNOSIS — N18.31 STAGE 3A CHRONIC KIDNEY DISEASE (MULTI): ICD-10-CM

## 2024-09-03 DIAGNOSIS — L95.8 URTICARIAL VASCULITIS: ICD-10-CM

## 2024-09-03 LAB
ANION GAP SERPL CALC-SCNC: 13 MMOL/L (ref 10–20)
BASOPHILS # BLD AUTO: 0.01 X10*3/UL (ref 0–0.1)
BASOPHILS NFR BLD AUTO: 0.1 %
BUN SERPL-MCNC: 19 MG/DL (ref 6–23)
C3 SERPL-MCNC: 144 MG/DL (ref 87–200)
C4 SERPL-MCNC: 45 MG/DL (ref 10–50)
CALCIUM SERPL-MCNC: 9.8 MG/DL (ref 8.6–10.3)
CHLORIDE SERPL-SCNC: 105 MMOL/L (ref 98–107)
CO2 SERPL-SCNC: 26 MMOL/L (ref 21–32)
CREAT SERPL-MCNC: 1.09 MG/DL (ref 0.5–1.05)
EGFRCR SERPLBLD CKD-EPI 2021: 51 ML/MIN/1.73M*2
EOSINOPHIL # BLD AUTO: 0.13 X10*3/UL (ref 0–0.4)
EOSINOPHIL NFR BLD AUTO: 1 %
ERYTHROCYTE [DISTWIDTH] IN BLOOD BY AUTOMATED COUNT: 13.4 % (ref 11.5–14.5)
GLUCOSE SERPL-MCNC: 99 MG/DL (ref 74–99)
HCT VFR BLD AUTO: 40.7 % (ref 36–46)
HGB BLD-MCNC: 12.9 G/DL (ref 12–16)
IGA SERPL-MCNC: 106 MG/DL (ref 70–400)
IGG SERPL-MCNC: 954 MG/DL (ref 700–1600)
IGM SERPL-MCNC: 40 MG/DL (ref 40–230)
IMM GRANULOCYTES # BLD AUTO: 0.07 X10*3/UL (ref 0–0.5)
IMM GRANULOCYTES NFR BLD AUTO: 0.5 % (ref 0–0.9)
LYMPHOCYTES # BLD AUTO: 1.74 X10*3/UL (ref 0.8–3)
LYMPHOCYTES NFR BLD AUTO: 13.4 %
MCH RBC QN AUTO: 29.5 PG (ref 26–34)
MCHC RBC AUTO-ENTMCNC: 31.7 G/DL (ref 32–36)
MCV RBC AUTO: 93 FL (ref 80–100)
MONOCYTES # BLD AUTO: 0.75 X10*3/UL (ref 0.05–0.8)
MONOCYTES NFR BLD AUTO: 5.8 %
NEUTROPHILS # BLD AUTO: 10.32 X10*3/UL (ref 1.6–5.5)
NEUTROPHILS NFR BLD AUTO: 79.2 %
NRBC BLD-RTO: 0 /100 WBCS (ref 0–0)
PLATELET # BLD AUTO: 279 X10*3/UL (ref 150–450)
POTASSIUM SERPL-SCNC: 4.3 MMOL/L (ref 3.5–5.3)
RBC # BLD AUTO: 4.38 X10*6/UL (ref 4–5.2)
SODIUM SERPL-SCNC: 140 MMOL/L (ref 136–145)
WBC # BLD AUTO: 13 X10*3/UL (ref 4.4–11.3)

## 2024-09-03 PROCEDURE — 86162 COMPLEMENT TOTAL (CH50): CPT

## 2024-09-03 PROCEDURE — 86160 COMPLEMENT ANTIGEN: CPT

## 2024-09-03 PROCEDURE — 80048 BASIC METABOLIC PNL TOTAL CA: CPT

## 2024-09-03 PROCEDURE — 85025 COMPLETE CBC W/AUTO DIFF WBC: CPT

## 2024-09-03 PROCEDURE — 83520 IMMUNOASSAY QUANT NOS NONAB: CPT

## 2024-09-03 PROCEDURE — 36415 COLL VENOUS BLD VENIPUNCTURE: CPT

## 2024-09-03 PROCEDURE — 82784 ASSAY IGA/IGD/IGG/IGM EACH: CPT

## 2024-09-03 PROCEDURE — 82960 TEST FOR G6PD ENZYME: CPT

## 2024-09-04 LAB — G6PD RBC QL: NORMAL

## 2024-09-05 LAB — CH50 SERPL-ACNC: >95 U/ML (ref 38.7–89.9)

## 2024-09-06 LAB — TRYPTASE SERPL-MCNC: 10.9 UG/L

## 2024-09-14 DIAGNOSIS — L50.9 URTICARIA: ICD-10-CM

## 2024-09-15 LAB — C1Q SERPL-MCNC: 14.3 MG/DL (ref 10.3–20.5)

## 2024-09-16 RX ORDER — FAMOTIDINE 40 MG/1
TABLET, FILM COATED ORAL
Qty: 90 TABLET | Refills: 1 | Status: SHIPPED | OUTPATIENT
Start: 2024-09-16

## 2024-09-26 DIAGNOSIS — J00 ACUTE RHINITIS: ICD-10-CM

## 2024-09-28 RX ORDER — MONTELUKAST SODIUM 10 MG/1
10 TABLET ORAL DAILY
Qty: 90 TABLET | Refills: 1 | Status: SHIPPED | OUTPATIENT
Start: 2024-09-28

## 2024-10-05 DIAGNOSIS — I25.10 CORONARY ARTERY DISEASE, UNSPECIFIED VESSEL OR LESION TYPE, UNSPECIFIED WHETHER ANGINA PRESENT, UNSPECIFIED WHETHER NATIVE OR TRANSPLANTED HEART: ICD-10-CM

## 2024-10-07 RX ORDER — NITROGLYCERIN 0.4 MG/1
0.4 TABLET SUBLINGUAL EVERY 5 MIN PRN
Qty: 75 TABLET | Refills: 1 | Status: SHIPPED | OUTPATIENT
Start: 2024-10-07

## 2024-10-09 ENCOUNTER — APPOINTMENT (OUTPATIENT)
Dept: ALLERGY | Facility: CLINIC | Age: 79
End: 2024-10-09
Payer: MEDICARE

## 2024-10-09 VITALS
SYSTOLIC BLOOD PRESSURE: 132 MMHG | RESPIRATION RATE: 17 BRPM | DIASTOLIC BLOOD PRESSURE: 80 MMHG | TEMPERATURE: 98 F | HEIGHT: 64 IN | BODY MASS INDEX: 24.41 KG/M2 | HEART RATE: 90 BPM | WEIGHT: 143 LBS | OXYGEN SATURATION: 95 %

## 2024-10-09 DIAGNOSIS — L95.8 URTICARIAL VASCULITIS: ICD-10-CM

## 2024-10-09 DIAGNOSIS — L50.9 URTICARIA: Primary | ICD-10-CM

## 2024-10-09 PROCEDURE — 1159F MED LIST DOCD IN RCRD: CPT | Performed by: ALLERGY & IMMUNOLOGY

## 2024-10-09 PROCEDURE — 99214 OFFICE O/P EST MOD 30 MIN: CPT | Performed by: ALLERGY & IMMUNOLOGY

## 2024-10-09 RX ORDER — PREDNISONE 1 MG/1
4 TABLET ORAL DAILY
Qty: 120 TABLET | Refills: 1 | Status: SHIPPED | OUTPATIENT
Start: 2024-10-09 | End: 2024-12-08

## 2024-10-09 NOTE — PATIENT INSTRUCTIONS
Decrease to 4 mg daily. Continue Allegra and Pepcid and Dapsone  Schedule 3 months follow up  Consider Xolair.

## 2024-10-09 NOTE — PROGRESS NOTES
"Patient ID: David Medina \"Dorothy" is a 79 y.o. female.     Chief Complaint: follow up  History Of Present Illness  David Medina \"Dorothy" is a 79 y.o. female with PMx hives, steroid dependent presenting for follow up.     Patient was able to stay at 5 mg prednisone dose. She tolerated Allegra, pepcid and dapsone without adverse effect.  She states with walking or when she gets warm she can sense some mild erythema and minor itch under the skin, but no hive break through.  She would like to be able to go off steroids, but is concerned that she will get hives.    Review of Systems    Pertinent positives and negatives have been assessed in the HPI. All other systems have been reviewed and are negative except as noted in the HPI.    Allergies  Boniva [ibandronate]    Past Medical History  She has a past medical history of Disorder of thyroid, unspecified (12/26/2021).    Family History  No family history on file.    Surgical History  She has a past surgical history that includes Other surgical history (05/16/2022); Other surgical history (05/16/2022); Other surgical history (05/16/2022); Other surgical history (05/16/2022); Other surgical history (05/16/2022); and Other surgical history (05/16/2022).    Social/Environmental History  She reports that she has never smoked. She has never used smokeless tobacco. She reports that she does not drink alcohol and does not use drugs.    MEDICATIONS  Current Outpatient Medications on File Prior to Visit   Medication Sig Dispense Refill    aspirin 81 mg EC tablet Take 1 tablet (81 mg) by mouth once daily.      atorvastatin (Lipitor) 80 mg tablet TAKE 1 TABLET BY MOUTH AT  BEDTIME 90 tablet 3    Bacillus coagulans 250 million cell tablet,chewable Chew.      benzonatate (Tessalon) 100 mg capsule Take 1 capsule (100 mg) by mouth 3 times a day as needed for cough. Do not crush or chew.      cetirizine (ZyrTEC) 10 mg tablet Take 1 tablet (10 mg) by mouth every 12 hours. 180 tablet 3 "    cholecalciferol (Vitamin D3) 50 MCG (2000 UT) tablet Take 1 tablet (50 mcg) by mouth once daily.      coenzyme Q-10 (Co Q-10) 100 mg capsule Take 1 capsule (100 mg) by mouth once daily.      coffee/theanine/superoxide dis (NEURIVA DE-STRESS ORAL) Take by mouth.      cranberry fruit extract (CRANBERRY CONCENTRATE ORAL) Take by mouth.      dapsone 25 mg tablet Take 1 tablet (25 mg) by mouth once daily at bedtime. 90 tablet 0    diphenhydrAMINE (Sominex) 25 mg tablet Take 1 tablet (25 mg) by mouth as needed at bedtime for sleep.      docusate sodium (Colace) 100 mg capsule Take 1 capsule (100 mg) by mouth 2 times a day.      ezetimibe (Zetia) 10 mg tablet TAKE 1 TABLET DAILY 90 tablet 3    famotidine (Pepcid) 40 mg tablet TAKE 1 TABLET BY MOUTH AS NEEDED AT BEDTIME FOR HEARTBURN 90 tablet 1    levothyroxine (Synthroid, Levoxyl) 100 mcg tablet TAKE 1 TABLET BY MOUTH ONCE  DAILY IN THE MORNING BEFORE A  MEAL 90 tablet 1    magnesium 250 mg tablet Take 1 tablet (250 mg) by mouth once daily.      montelukast (Singulair) 10 mg tablet TAKE 1 TABLET BY MOUTH ONCE  DAILY 90 tablet 1    multivit-min/iron/FA/vit K/lut (CENTRUM SILVER WOMEN ORAL) Take by mouth.      nitroglycerin (Nitrostat) 0.4 mg SL tablet PLACE 1 TABLET (0.4 MG) UNDER THE TONGUE EVERY 5 MINUTES IF NEEDED FOR CHEST PAIN. MAY REPEAT EVERY 5 MINUTES FOR UP TO 3 DOSES. 75 tablet 1    oxymetazoline (Vicks Sinex 12-Hour) 0.05 % nasal spray Administer into each nostril. Do not use for more than 3 days.      predniSONE (Deltasone) 5 mg tablet Take 1 tablet (5 mg) by mouth once daily. 30 tablet 1    red yeast rice 600 mg capsule Take by mouth.      urea/lactic acid/propylene gly (KERASAL MULTI-PURPOSE NAIL REP TOP) Apply topically.      [DISCONTINUED] nitroglycerin (Nitrostat) 0.4 mg SL tablet PLACE 1 TABLET (0.4 MG) UNDER THE TONGUE EVERY 5 MINUTES IF NEEDED FOR CHEST PAIN. MAY REPEAT EVERY 5 MINUTES FOR UP TO 3 DOSES. 25 tablet 1     No current  "facility-administered medications on file prior to visit.         Physical Exam  Visit Vitals  /80   Pulse 90   Temp 36.7 °C (98 °F) (Temporal)   Resp 17   Ht 1.626 m (5' 4\")   Wt 64.9 kg (143 lb)   SpO2 95%   BMI 24.55 kg/m²   Smoking Status Never   BSA 1.71 m²       Wt Readings from Last 1 Encounters:   10/09/24 64.9 kg (143 lb)       Physical Exam    General: Well appearing, no acute distress  Head: Normocephalic, atraumatic, neck supple without lymphadenopathy  Eyes: non-injected  Nose: No nasal crease, nares patent  Throat: Normal dentition, no erythema  Heart: Regular rate and rhythm  Lungs: Clear to auscultation bilaterally, effort normal  Extremities: Moves all extremities symmetrically, no edema  Skin: No rashes/lesions  Psych: normal mood and affect    LAB RESULTS:  CBC:  Recent Labs     09/03/24  1055 08/01/24  0816 05/12/24  0830   WBC 13.0* 8.9 6.8   HGB 12.9 11.7* 12.6   HCT 40.7 36.9 39.0    224 323   MCV 93 92 90   EOSABS 0.13 0.09 0.06       CMP:  Recent Labs     09/03/24  1055 08/01/24  1722 08/01/24  0816 06/04/24  1146     --  141  141 144   K 4.3  --  4.5  4.5 4.4     --  107  107 109*   CO2 26  --  26  28 26   ANIONGAP 13  --  13  11 13   BUN 19  --  26*  26* 27*   CREATININE 1.09*  --  1.36*  1.28* 1.20*   EGFR 51*  --  39*  42* 46*   MG  --  2.35 2.40  --      Recent Labs     08/01/24  0816 06/04/24  1146 05/12/24  0830   ALBUMIN 4.2 4.4 3.8   ALKPHOS 40 42 41   ALT 18 13 10   AST 22 17 18   BILITOT 0.5 0.5 0.9       Recent Labs     09/03/24  1055 08/01/24  0816 05/12/24  0830   EOSABS 0.13 0.09 0.06     Recent Labs     09/03/24  1055 06/04/24  1146   TRYPTASE 10.9 15.8*       IMMUNO:   Recent Labs     09/03/24  1055         IGM 40       HEME/ENDO:  Recent Labs     08/01/24  0816 01/15/24  0958 08/02/23  0817   TSH 2.59 1.01 0.07*         Assessment/Plan   78 yo woman who has a history of pruritic rash that had become dependent on steroids to " control. She is on H1H2 daily, Dapsone at bedtime and has been able to wean from 10 mg to 5 mg of prednisone. Her tryptase level is back in normal range and I will continue to monitor. She will plan to go to 4 mg of prednisone and then we can reassess how she is doing with this. We discussed that Xolair could be a possible treatment strategy and I provided written information regarding this medication  Follow up 3 months.    Kayleen Saeed, DO

## 2024-11-01 DIAGNOSIS — E78.00 HYPERCHOLESTEROLEMIA: ICD-10-CM

## 2024-11-01 DIAGNOSIS — E03.9 ACQUIRED HYPOTHYROIDISM: ICD-10-CM

## 2024-11-01 DIAGNOSIS — E78.5 DYSLIPIDEMIA: ICD-10-CM

## 2024-11-01 RX ORDER — ATORVASTATIN CALCIUM 80 MG/1
80 TABLET, FILM COATED ORAL NIGHTLY
Qty: 90 TABLET | Refills: 3 | Status: SHIPPED | OUTPATIENT
Start: 2024-11-01

## 2024-11-01 RX ORDER — LEVOTHYROXINE SODIUM 100 UG/1
100 TABLET ORAL
Qty: 90 TABLET | Refills: 1 | Status: SHIPPED | OUTPATIENT
Start: 2024-11-01

## 2024-11-01 RX ORDER — EZETIMIBE 10 MG/1
10 TABLET ORAL DAILY
Qty: 90 TABLET | Refills: 3 | Status: SHIPPED | OUTPATIENT
Start: 2024-11-01

## 2024-11-05 DIAGNOSIS — L95.8 URTICARIAL VASCULITIS: ICD-10-CM

## 2024-11-05 RX ORDER — DAPSONE 25 MG/1
25 TABLET ORAL NIGHTLY
Qty: 90 TABLET | Refills: 3 | Status: SHIPPED | OUTPATIENT
Start: 2024-11-05

## 2024-12-09 ENCOUNTER — APPOINTMENT (OUTPATIENT)
Dept: PRIMARY CARE | Facility: CLINIC | Age: 79
End: 2024-12-09
Payer: MEDICARE

## 2025-01-15 ENCOUNTER — APPOINTMENT (OUTPATIENT)
Dept: ALLERGY | Facility: CLINIC | Age: 80
End: 2025-01-15
Payer: MEDICARE

## 2025-01-15 VITALS
DIASTOLIC BLOOD PRESSURE: 80 MMHG | BODY MASS INDEX: 24.59 KG/M2 | WEIGHT: 144 LBS | HEART RATE: 80 BPM | OXYGEN SATURATION: 99 % | HEIGHT: 64 IN | TEMPERATURE: 98 F | RESPIRATION RATE: 17 BRPM | SYSTOLIC BLOOD PRESSURE: 128 MMHG

## 2025-01-15 DIAGNOSIS — L50.8 CHRONIC URTICARIA: Primary | ICD-10-CM

## 2025-01-15 DIAGNOSIS — R74.8 ELEVATED SERUM TRYPTASE: ICD-10-CM

## 2025-01-15 PROCEDURE — 1159F MED LIST DOCD IN RCRD: CPT | Performed by: ALLERGY & IMMUNOLOGY

## 2025-01-15 PROCEDURE — 99214 OFFICE O/P EST MOD 30 MIN: CPT | Performed by: ALLERGY & IMMUNOLOGY

## 2025-01-15 RX ORDER — PREDNISONE 1 MG/1
2 TABLET ORAL DAILY
Qty: 60 TABLET | Refills: 3 | Status: SHIPPED | OUTPATIENT
Start: 2025-01-15 | End: 2025-05-15

## 2025-01-15 NOTE — PATIENT INSTRUCTIONS
OK to go from 2 mg to 1 mg on the prednisone.  Continue other medications, but may consider a trial off of Singulair.  Follow in 3 months.

## 2025-01-15 NOTE — PROGRESS NOTES
"Patient ID: David Medina \"Dorothy" is a 79 y.o. female.     Chief Complaint: Follow up  History Of Present Illness  David Medina \"Dorothy" is a 79 y.o. female with PMx hives and steroid dependence presenting for follow up.     Has discontinued meloxicam for her arthritis and seems to be doing ok.  Continues on Zyrtec, Pepcid, Dapsone and right now on prednisone 2 mg daily.  She also takes Singulair at bed. She is willing to try off of this.  She occasionally reports that her scalp itches without hives.    Review of Systems    Pertinent positives and negatives have been assessed in the HPI. All other systems have been reviewed and are negative except as noted in the HPI.    Allergies  Boniva [ibandronate]    Past Medical History  She has a past medical history of Disorder of thyroid, unspecified (12/26/2021).    Family History  No family history on file.    No history of food allergy or atopic disease in the family.    Surgical History  She has a past surgical history that includes Other surgical history (05/16/2022); Other surgical history (05/16/2022); Other surgical history (05/16/2022); Other surgical history (05/16/2022); Other surgical history (05/16/2022); and Other surgical history (05/16/2022).    Social/Environmental History  She reports that she has never smoked. She has never used smokeless tobacco. She reports that she does not drink alcohol and does not use drugs.      MEDICATIONS  Current Outpatient Medications on File Prior to Visit   Medication Sig Dispense Refill    aspirin 81 mg EC tablet Take 1 tablet (81 mg) by mouth once daily.      atorvastatin (Lipitor) 80 mg tablet TAKE 1 TABLET BY MOUTH AT  BEDTIME 90 tablet 3    Bacillus coagulans 250 million cell tablet,chewable Chew.      benzonatate (Tessalon) 100 mg capsule Take 1 capsule (100 mg) by mouth 3 times a day as needed for cough. Do not crush or chew.      cetirizine (ZyrTEC) 10 mg tablet Take 1 tablet (10 mg) by mouth every 12 hours. 180 " "tablet 3    cholecalciferol (Vitamin D3) 50 MCG (2000 UT) tablet Take 1 tablet (50 mcg) by mouth once daily.      coenzyme Q-10 (Co Q-10) 100 mg capsule Take 1 capsule (100 mg) by mouth once daily.      coffee/theanine/superoxide dis (NEURIVA DE-STRESS ORAL) Take by mouth.      cranberry fruit extract (CRANBERRY CONCENTRATE ORAL) Take by mouth.      dapsone 25 mg tablet TAKE 1 TABLET BY MOUTH ONCE  DAILY AT BEDTIME 90 tablet 3    diphenhydrAMINE (Sominex) 25 mg tablet Take 1 tablet (25 mg) by mouth as needed at bedtime for sleep.      docusate sodium (Colace) 100 mg capsule Take 1 capsule (100 mg) by mouth 2 times a day.      ezetimibe (Zetia) 10 mg tablet TAKE 1 TABLET BY MOUTH DAILY 90 tablet 3    famotidine (Pepcid) 40 mg tablet TAKE 1 TABLET BY MOUTH AS NEEDED AT BEDTIME FOR HEARTBURN 90 tablet 1    levothyroxine (Synthroid, Levoxyl) 100 mcg tablet TAKE 1 TABLET BY MOUTH ONCE  DAILY IN THE MORNING BEFORE A  MEAL 90 tablet 1    magnesium 250 mg tablet Take 1 tablet (250 mg) by mouth once daily.      montelukast (Singulair) 10 mg tablet TAKE 1 TABLET BY MOUTH ONCE  DAILY 90 tablet 1    multivit-min/iron/FA/vit K/lut (CENTRUM SILVER WOMEN ORAL) Take by mouth.      nitroglycerin (Nitrostat) 0.4 mg SL tablet PLACE 1 TABLET (0.4 MG) UNDER THE TONGUE EVERY 5 MINUTES IF NEEDED FOR CHEST PAIN. MAY REPEAT EVERY 5 MINUTES FOR UP TO 3 DOSES. 75 tablet 1    oxymetazoline (Vicks Sinex 12-Hour) 0.05 % nasal spray Administer into each nostril. Do not use for more than 3 days.      red yeast rice 600 mg capsule Take by mouth.      urea/lactic acid/propylene gly (KERASAL MULTI-PURPOSE NAIL REP TOP) Apply topically.       No current facility-administered medications on file prior to visit.         Physical Exam  Visit Vitals  /80   Pulse 80   Temp 36.7 °C (98 °F) (Temporal)   Resp 17   Ht 1.626 m (5' 4\")   Wt 65.3 kg (144 lb)   SpO2 99%   BMI 24.72 kg/m²   Smoking Status Never   BSA 1.72 m²       Wt Readings from Last 1 " "Encounters:   01/15/25 65.3 kg (144 lb)       Physical Exam    General: Well appearing, no acute distress  Head: Normocephalic, atraumatic, neck supple without lymphadenopathy  Eyes: EOMI, non-injected  Nose: No nasal crease, nares patent without drainage.  Throat: Normal dentition, upper partial no erythema  Heart: Regular rate and rhythm  Lungs: Clear to auscultation bilaterally, effort normal  Extremities: Moves all extremities symmetrically, no edema  Skin: No rashes/lesions and no hives  Psych: normal mood and affect    LAB RESULTS:  CBC:  Recent Labs     09/03/24  1055 08/01/24  0816 05/12/24  0830   WBC 13.0* 8.9 6.8   HGB 12.9 11.7* 12.6   HCT 40.7 36.9 39.0    224 323   MCV 93 92 90   EOSABS 0.13 0.09 0.06       CMP:  Recent Labs     09/03/24  1055 08/01/24  1722 08/01/24  0816 06/04/24  1146     --  141  141 144   K 4.3  --  4.5  4.5 4.4     --  107  107 109*   CO2 26  --  26  28 26   ANIONGAP 13  --  13  11 13   BUN 19  --  26*  26* 27*   CREATININE 1.09*  --  1.36*  1.28* 1.20*   EGFR 51*  --  39*  42* 46*   MG  --  2.35 2.40  --      Recent Labs     08/01/24  0816 06/04/24  1146 05/12/24  0830   ALBUMIN 4.2 4.4 3.8   ALKPHOS 40 42 41   ALT 18 13 10   AST 22 17 18   BILITOT 0.5 0.5 0.9       ALLERGY:   Recent Labs     09/03/24  1055 08/01/24  0816 05/12/24  0830   EOSABS 0.13 0.09 0.06     Recent Labs     09/03/24  1055 06/04/24  1146   TRYPTASE 10.9 15.8*       IMMUNO:   Recent Labs     09/03/24  1055         IGM 40       HEME/ENDO:  Recent Labs     08/01/24  0816 01/15/24  0958 08/02/23  0817   TSH 2.59 1.01 0.07*         Assessment/Plan   Aretta \"Tea\" is an 78 yo woman with a history of elevated tryptase and hives. She has been weaning her steroids and feeling well without breakthrough hives other than some occasional feeling of pruritus on the scalp.  She will continue to minimize medications. I will have her obtain tryptase with next lab draw and plan 3 " month follow up.    Kayleen Saeed DO

## 2025-02-13 DIAGNOSIS — L50.9 URTICARIA: ICD-10-CM

## 2025-02-13 RX ORDER — FAMOTIDINE 40 MG/1
TABLET, FILM COATED ORAL
Qty: 90 TABLET | Refills: 1 | Status: SHIPPED | OUTPATIENT
Start: 2025-02-13

## 2025-03-12 ENCOUNTER — TELEPHONE (OUTPATIENT)
Dept: PRIMARY CARE | Facility: CLINIC | Age: 80
End: 2025-03-12
Payer: MEDICARE

## 2025-03-12 DIAGNOSIS — E55.9 VITAMIN D DEFICIENCY: ICD-10-CM

## 2025-03-12 DIAGNOSIS — E78.5 DYSLIPIDEMIA: ICD-10-CM

## 2025-03-12 DIAGNOSIS — E03.9 ACQUIRED HYPOTHYROIDISM: ICD-10-CM

## 2025-03-12 DIAGNOSIS — N18.31 STAGE 3A CHRONIC KIDNEY DISEASE (MULTI): ICD-10-CM

## 2025-03-12 DIAGNOSIS — R73.9 HYPERGLYCEMIA: ICD-10-CM

## 2025-03-12 NOTE — TELEPHONE ENCOUNTER
Pt would like lab orders put in so she can take care of before her appointment    Please advise and call pt whne ready

## 2025-03-14 LAB
25(OH)D3+25(OH)D2 SERPL-MCNC: 44 NG/ML (ref 30–100)
ALBUMIN SERPL-MCNC: 4.5 G/DL (ref 3.6–5.1)
ALP SERPL-CCNC: 59 U/L (ref 37–153)
ALT SERPL-CCNC: 18 U/L (ref 6–29)
ANION GAP SERPL CALCULATED.4IONS-SCNC: 7 MMOL/L (CALC) (ref 7–17)
AST SERPL-CCNC: 29 U/L (ref 10–35)
BASOPHILS # BLD AUTO: 63 CELLS/UL (ref 0–200)
BASOPHILS NFR BLD AUTO: 0.8 %
BILIRUB SERPL-MCNC: 0.8 MG/DL (ref 0.2–1.2)
BUN SERPL-MCNC: 23 MG/DL (ref 7–25)
CALCIUM SERPL-MCNC: 9.9 MG/DL (ref 8.6–10.4)
CHLORIDE SERPL-SCNC: 106 MMOL/L (ref 98–110)
CHOLEST SERPL-MCNC: 137 MG/DL
CHOLEST/HDLC SERPL: 2.5 (CALC)
CO2 SERPL-SCNC: 29 MMOL/L (ref 20–32)
CREAT SERPL-MCNC: 0.95 MG/DL (ref 0.6–0.95)
EGFRCR SERPLBLD CKD-EPI 2021: 61 ML/MIN/1.73M2
EOSINOPHIL # BLD AUTO: 379 CELLS/UL (ref 15–500)
EOSINOPHIL NFR BLD AUTO: 4.8 %
ERYTHROCYTE [DISTWIDTH] IN BLOOD BY AUTOMATED COUNT: 12 % (ref 11–15)
EST. AVERAGE GLUCOSE BLD GHB EST-MCNC: 88 MG/DL
EST. AVERAGE GLUCOSE BLD GHB EST-SCNC: 4.9 MMOL/L
FT4I SERPL CALC-MCNC: 2.7 (ref 1.4–3.8)
GLUCOSE SERPL-MCNC: 98 MG/DL (ref 65–99)
HBA1C MFR BLD: 4.7 % OF TOTAL HGB
HCT VFR BLD AUTO: 33 % (ref 35–45)
HDLC SERPL-MCNC: 54 MG/DL
HGB BLD-MCNC: 10.3 G/DL (ref 11.7–15.5)
LDLC SERPL CALC-MCNC: 63 MG/DL (CALC)
LYMPHOCYTES # BLD AUTO: 2173 CELLS/UL (ref 850–3900)
LYMPHOCYTES NFR BLD AUTO: 27.5 %
MCH RBC QN AUTO: 29.3 PG (ref 27–33)
MCHC RBC AUTO-ENTMCNC: 31.2 G/DL (ref 32–36)
MCV RBC AUTO: 94 FL (ref 80–100)
MONOCYTES # BLD AUTO: 798 CELLS/UL (ref 200–950)
MONOCYTES NFR BLD AUTO: 10.1 %
NEUTROPHILS # BLD AUTO: 4487 CELLS/UL (ref 1500–7800)
NEUTROPHILS NFR BLD AUTO: 56.8 %
NONHDLC SERPL-MCNC: 83 MG/DL (CALC)
PLATELET # BLD AUTO: 305 THOUSAND/UL (ref 140–400)
PMV BLD REES-ECKER: 10.9 FL (ref 7.5–12.5)
POTASSIUM SERPL-SCNC: 4.3 MMOL/L (ref 3.5–5.3)
PROT SERPL-MCNC: 6.8 G/DL (ref 6.1–8.1)
RBC # BLD AUTO: 3.51 MILLION/UL (ref 3.8–5.1)
SODIUM SERPL-SCNC: 142 MMOL/L (ref 135–146)
T3RU NFR SERPL: 28 % (ref 22–35)
T4 SERPL-MCNC: 9.5 MCG/DL (ref 5.1–11.9)
THYROPEROXIDASE AB SERPL-ACNC: 206 IU/ML
TRIGL SERPL-MCNC: 122 MG/DL
TSH SERPL-ACNC: 1.75 MIU/L (ref 0.4–4.5)
WBC # BLD AUTO: 7.9 THOUSAND/UL (ref 3.8–10.8)

## 2025-03-18 LAB — TRYPTASE SERPL-MCNC: 11.5 MCG/L

## 2025-04-02 ENCOUNTER — APPOINTMENT (OUTPATIENT)
Dept: ALLERGY | Facility: CLINIC | Age: 80
End: 2025-04-02
Payer: MEDICARE

## 2025-04-02 VITALS
BODY MASS INDEX: 24.59 KG/M2 | HEIGHT: 64 IN | DIASTOLIC BLOOD PRESSURE: 70 MMHG | SYSTOLIC BLOOD PRESSURE: 128 MMHG | RESPIRATION RATE: 17 BRPM | HEART RATE: 72 BPM | TEMPERATURE: 97.5 F | OXYGEN SATURATION: 96 % | WEIGHT: 144 LBS

## 2025-04-02 DIAGNOSIS — R74.8 ELEVATED SERUM TRYPTASE: ICD-10-CM

## 2025-04-02 DIAGNOSIS — D64.9 ANEMIA, UNSPECIFIED TYPE: Primary | ICD-10-CM

## 2025-04-02 DIAGNOSIS — L50.8 CHRONIC URTICARIA: ICD-10-CM

## 2025-04-02 PROCEDURE — 1159F MED LIST DOCD IN RCRD: CPT | Performed by: ALLERGY & IMMUNOLOGY

## 2025-04-02 PROCEDURE — 99214 OFFICE O/P EST MOD 30 MIN: CPT | Performed by: ALLERGY & IMMUNOLOGY

## 2025-04-02 RX ORDER — LORATADINE 10 MG/1
10 TABLET ORAL DAILY
COMMUNITY

## 2025-04-02 ASSESSMENT — ENCOUNTER SYMPTOMS
EYES NEGATIVE: 1
MUSCULOSKELETAL NEGATIVE: 1
RESPIRATORY NEGATIVE: 1
GASTROINTESTINAL NEGATIVE: 1
CONSTITUTIONAL NEGATIVE: 1
CARDIOVASCULAR NEGATIVE: 1

## 2025-04-02 NOTE — PATIENT INSTRUCTIONS
OK to try every other day prednisone.  Follow up 3 month   Source of UTI  blood culture positive for E. Coli, pansensitive  Continue Rocephin, changed to po at the discharge

## 2025-04-02 NOTE — PROGRESS NOTES
"Patient ID: David Medina \"Tea\" is a 79 y.o. female.     Chief Complaint: Follow up  History Of Present Illness  David Medina \"Dorothy" is a 79 y.o. female with PMx hives and steroid dependence presenting for follow up.     Still ok off Meloxicam.  Continues on Zyrtec, Pepcid, Dapsone and right now on prednisone 1 mg daily. Decreased from 2 mg about 2 months ago.  She also takes Singulair at bed. Did try off and felt like she had asthma symptoms, so restarted.  She occasionally reports that her scalp itches without hives, but minor.    Review of Systems   Constitutional: Negative.    HENT: Negative.     Eyes: Negative.    Respiratory: Negative.     Cardiovascular: Negative.    Gastrointestinal: Negative.    Musculoskeletal: Negative.    Skin: Negative.        Allergies  Boniva [ibandronate]    Past Medical History  She has a past medical history of Disorder of thyroid, unspecified (12/26/2021).    Family History  No family history on file.  Sister has severe asthma    Surgical History  She has a past surgical history that includes Other surgical history (05/16/2022); Other surgical history (05/16/2022); Other surgical history (05/16/2022); Other surgical history (05/16/2022); Other surgical history (05/16/2022); and Other surgical history (05/16/2022).    Social/Environmental History  She reports that she has never smoked. She has never used smokeless tobacco. She reports that she does not drink alcohol and does not use drugs.      MEDICATIONS  Current Outpatient Medications on File Prior to Visit   Medication Sig Dispense Refill   • aspirin 81 mg EC tablet Take 1 tablet (81 mg) by mouth once daily.     • atorvastatin (Lipitor) 80 mg tablet TAKE 1 TABLET BY MOUTH AT  BEDTIME 90 tablet 3   • Bacillus coagulans 250 million cell tablet,chewable Chew.     • benzonatate (Tessalon) 100 mg capsule Take 1 capsule (100 mg) by mouth 3 times a day as needed for cough. Do not crush or chew.     • cholecalciferol (Vitamin D3) " 50 MCG (2000 UT) tablet Take 1 tablet (50 mcg) by mouth once daily.     • coenzyme Q-10 (Co Q-10) 100 mg capsule Take 1 capsule (100 mg) by mouth once daily.     • coffee/theanine/superoxide dis (NEURIVA DE-STRESS ORAL) Take by mouth.     • cranberry fruit extract (CRANBERRY CONCENTRATE ORAL) Take by mouth.     • dapsone 25 mg tablet TAKE 1 TABLET BY MOUTH ONCE  DAILY AT BEDTIME 90 tablet 3   • diphenhydrAMINE (Sominex) 25 mg tablet Take 1 tablet (25 mg) by mouth as needed at bedtime for sleep.     • docusate sodium (Colace) 100 mg capsule Take 1 capsule (100 mg) by mouth 2 times a day.     • ezetimibe (Zetia) 10 mg tablet TAKE 1 TABLET BY MOUTH DAILY 90 tablet 3   • famotidine (Pepcid) 40 mg tablet TAKE 1 TABLET BY MOUTH AS NEEDED AT BEDTIME FOR HEARTBURN 90 tablet 1   • levothyroxine (Synthroid, Levoxyl) 100 mcg tablet TAKE 1 TABLET BY MOUTH ONCE  DAILY IN THE MORNING BEFORE A  MEAL 90 tablet 1   • loratadine (Claritin) 10 mg tablet Take 1 tablet (10 mg) by mouth once daily.     • magnesium 250 mg tablet Take 1 tablet (250 mg) by mouth once daily.     • montelukast (Singulair) 10 mg tablet TAKE 1 TABLET BY MOUTH ONCE  DAILY 90 tablet 1   • multivit-min/iron/FA/vit K/lut (CENTRUM SILVER WOMEN ORAL) Take by mouth.     • nitroglycerin (Nitrostat) 0.4 mg SL tablet PLACE 1 TABLET (0.4 MG) UNDER THE TONGUE EVERY 5 MINUTES IF NEEDED FOR CHEST PAIN. MAY REPEAT EVERY 5 MINUTES FOR UP TO 3 DOSES. 75 tablet 1   • oxymetazoline (Vicks Sinex 12-Hour) 0.05 % nasal spray Administer into each nostril. Do not use for more than 3 days.     • predniSONE (Deltasone) 1 mg tablet Take 2 tablets (2 mg) by mouth once daily. 60 tablet 3   • red yeast rice 600 mg capsule Take by mouth.     • urea/lactic acid/propylene gly (KERASAL MULTI-PURPOSE NAIL REP TOP) Apply topically.     • cetirizine (ZyrTEC) 10 mg tablet Take 1 tablet (10 mg) by mouth every 12 hours. (Patient not taking: Reported on 4/2/2025) 180 tablet 3     No current  "facility-administered medications on file prior to visit.         Physical Exam  Visit Vitals  /70   Pulse 72   Temp 36.4 °C (97.5 °F) (Temporal)   Resp 17   Ht 1.626 m (5' 4\")   Wt 65.3 kg (144 lb)   SpO2 96%   BMI 24.72 kg/m²   Smoking Status Never   BSA 1.72 m²       Wt Readings from Last 1 Encounters:   04/02/25 65.3 kg (144 lb)       Physical Exam    General: Well appearing, no acute distress  Head: Normocephalic, atraumatic, neck supple without lymphadenopathy  Eyes: EOMI, non-injected  Ears: TM's normal  Nose: No nasal crease, nares patent without drainage.  Throat: Normal dentition, upper partial no erythema  Heart: Regular rate and rhythm  Lungs: Clear to auscultation bilaterally, effort normal  Extremities: Moves all extremities symmetrically, no edema  Skin: No rashes/lesions and no hives  Psych: normal mood and affect    LAB RESULTS:  CBC:  Recent Labs     03/13/25  1018 09/03/24  1055 08/01/24  0816   WBC 7.9 13.0* 8.9   HGB 10.3* 12.9 11.7*   HCT 33.0* 40.7 36.9    279 224   MCV 94.0 93 92   EOSABS 379 0.13 0.09       CMP:  Recent Labs     03/13/25  1018 09/03/24  1055 08/01/24  1722 08/01/24  0816    140  --  141  141   K 4.3 4.3  --  4.5  4.5    105  --  107  107   CO2 29 26  --  26  28   ANIONGAP 7 13  --  13  11   BUN 23 19  --  26*  26*   CREATININE 0.95 1.09*  --  1.36*  1.28*   EGFR 61 51*  --  39*  42*   MG  --   --  2.35 2.40     Recent Labs     03/13/25  1018 08/01/24  0816 06/04/24  1146   ALBUMIN 4.5 4.2 4.4   ALKPHOS 59 40 42   ALT 18 18 13   AST 29 22 17   BILITOT 0.8 0.5 0.5       ALLERGY:   Recent Labs     03/13/25  1018 09/03/24  1055 08/01/24  0816   EOSABS 379 0.13 0.09     Recent Labs     03/13/25  1020 09/03/24  1055 06/04/24  1146   TRYPTASE 11.5* 10.9 15.8*       IMMUNO:   Recent Labs     09/03/24  1055         IGM 40       HEME/ENDO:  Recent Labs     03/13/25  1018 08/01/24  0816 01/15/24  0958   TSH 1.75 2.59 1.01   HGBA1C 4.7  --  " " --          Assessment/Plan   David \"Tea\" is an 78 yo woman with a history of elevated tryptase and hives. She has been weaning her steroids and feeling well without breakthrough hives other than some occasional feeling of pruritus on the scalp.  She will continue to minimize medications.  No changes in medications other than try every other day prednisone.  No additional labs at this time.    Kayleen Saeed, DO   "

## 2025-04-30 DIAGNOSIS — R05.1 ACUTE COUGH: ICD-10-CM

## 2025-04-30 DIAGNOSIS — J01.01 ACUTE RECURRENT MAXILLARY SINUSITIS: ICD-10-CM

## 2025-04-30 RX ORDER — AMOXICILLIN AND CLAVULANATE POTASSIUM 875; 125 MG/1; MG/1
875 TABLET, FILM COATED ORAL 2 TIMES DAILY
Qty: 20 TABLET | Refills: 0 | Status: SHIPPED | OUTPATIENT
Start: 2025-04-30 | End: 2025-05-10

## 2025-04-30 RX ORDER — BENZONATATE 100 MG/1
100-200 CAPSULE ORAL 3 TIMES DAILY PRN
Qty: 60 CAPSULE | Refills: 1 | Status: SHIPPED | OUTPATIENT
Start: 2025-04-30 | End: 2025-05-30

## 2025-04-30 NOTE — TELEPHONE ENCOUNTER
PT STATES SHE HAS A SINUS INFECTION AND WOULD LIKE MEDICATION.     PT HAS APPT ON MAY 5. DECLINED WALK IN.    PT USES [facility] AT [address]

## 2025-05-06 ENCOUNTER — APPOINTMENT (OUTPATIENT)
Dept: PRIMARY CARE | Facility: CLINIC | Age: 80
End: 2025-05-06
Payer: MEDICARE

## 2025-05-06 VITALS
HEIGHT: 64 IN | HEART RATE: 83 BPM | SYSTOLIC BLOOD PRESSURE: 124 MMHG | OXYGEN SATURATION: 93 % | TEMPERATURE: 97.6 F | WEIGHT: 141 LBS | RESPIRATION RATE: 16 BRPM | DIASTOLIC BLOOD PRESSURE: 70 MMHG | BODY MASS INDEX: 24.07 KG/M2

## 2025-05-06 DIAGNOSIS — M81.0 AGE-RELATED OSTEOPOROSIS WITHOUT CURRENT PATHOLOGICAL FRACTURE: ICD-10-CM

## 2025-05-06 DIAGNOSIS — Z00.00 MEDICARE ANNUAL WELLNESS VISIT, SUBSEQUENT: Primary | ICD-10-CM

## 2025-05-06 DIAGNOSIS — Z12.11 COLON CANCER SCREENING: ICD-10-CM

## 2025-05-06 DIAGNOSIS — R53.83 FATIGUE, UNSPECIFIED TYPE: ICD-10-CM

## 2025-05-06 DIAGNOSIS — D50.8 IRON DEFICIENCY ANEMIA SECONDARY TO INADEQUATE DIETARY IRON INTAKE: ICD-10-CM

## 2025-05-06 DIAGNOSIS — E78.5 DYSLIPIDEMIA: ICD-10-CM

## 2025-05-06 DIAGNOSIS — J30.1 SEASONAL ALLERGIC RHINITIS DUE TO POLLEN: ICD-10-CM

## 2025-05-06 PROBLEM — N18.32 STAGE 3B CHRONIC KIDNEY DISEASE (MULTI): Status: RESOLVED | Noted: 2024-06-02 | Resolved: 2025-05-06

## 2025-05-06 PROBLEM — N18.31 STAGE 3A CHRONIC KIDNEY DISEASE (MULTI): Status: RESOLVED | Noted: 2024-02-10 | Resolved: 2025-05-06

## 2025-05-06 PROBLEM — E78.00 HYPERCHOLESTEROLEMIA: Status: RESOLVED | Noted: 2023-08-01 | Resolved: 2025-05-06

## 2025-05-06 PROBLEM — E05.90 HYPERTHYROIDISM: Status: RESOLVED | Noted: 2023-08-01 | Resolved: 2025-05-06

## 2025-05-06 PROBLEM — R00.0 SINUS TACHYCARDIA: Status: RESOLVED | Noted: 2024-03-15 | Resolved: 2025-05-06

## 2025-05-06 LAB — POC HEMOGLOBIN: 9.6 G/DL (ref 12–16)

## 2025-05-06 PROCEDURE — 85018 HEMOGLOBIN: CPT | Performed by: FAMILY MEDICINE

## 2025-05-06 PROCEDURE — 1036F TOBACCO NON-USER: CPT | Performed by: FAMILY MEDICINE

## 2025-05-06 PROCEDURE — 1159F MED LIST DOCD IN RCRD: CPT | Performed by: FAMILY MEDICINE

## 2025-05-06 PROCEDURE — 1158F ADVNC CARE PLAN TLK DOCD: CPT | Performed by: FAMILY MEDICINE

## 2025-05-06 PROCEDURE — 1160F RVW MEDS BY RX/DR IN RCRD: CPT | Performed by: FAMILY MEDICINE

## 2025-05-06 PROCEDURE — 96372 THER/PROPH/DIAG INJ SC/IM: CPT | Performed by: FAMILY MEDICINE

## 2025-05-06 PROCEDURE — 1123F ACP DISCUSS/DSCN MKR DOCD: CPT | Performed by: FAMILY MEDICINE

## 2025-05-06 PROCEDURE — 1170F FXNL STATUS ASSESSED: CPT | Performed by: FAMILY MEDICINE

## 2025-05-06 PROCEDURE — 99497 ADVNCD CARE PLAN 30 MIN: CPT | Performed by: FAMILY MEDICINE

## 2025-05-06 PROCEDURE — G0439 PPPS, SUBSEQ VISIT: HCPCS | Performed by: FAMILY MEDICINE

## 2025-05-06 RX ORDER — TRIAMCINOLONE ACETONIDE 40 MG/ML
80 INJECTION, SUSPENSION INTRA-ARTICULAR; INTRAMUSCULAR ONCE
Status: COMPLETED | OUTPATIENT
Start: 2025-05-06 | End: 2025-05-06

## 2025-05-06 RX ADMIN — TRIAMCINOLONE ACETONIDE 80 MG: 40 INJECTION, SUSPENSION INTRA-ARTICULAR; INTRAMUSCULAR at 11:05

## 2025-05-06 ASSESSMENT — ACTIVITIES OF DAILY LIVING (ADL)
DOING_HOUSEWORK: INDEPENDENT
MANAGING_FINANCES: INDEPENDENT
GROCERY_SHOPPING: INDEPENDENT
TAKING_MEDICATION: INDEPENDENT
DRESSING: INDEPENDENT
BATHING: INDEPENDENT

## 2025-05-06 ASSESSMENT — PATIENT HEALTH QUESTIONNAIRE - PHQ9
SUM OF ALL RESPONSES TO PHQ9 QUESTIONS 1 AND 2: 0
1. LITTLE INTEREST OR PLEASURE IN DOING THINGS: NOT AT ALL
2. FEELING DOWN, DEPRESSED OR HOPELESS: NOT AT ALL

## 2025-05-06 ASSESSMENT — ENCOUNTER SYMPTOMS
DEPRESSION: 0
OCCASIONAL FEELINGS OF UNSTEADINESS: 0
LOSS OF SENSATION IN FEET: 0

## 2025-05-06 NOTE — PROGRESS NOTES
"Subjective   Reason for Visit: David Medina is an 80 y.o. female here for a Medicare Wellness visit.       Past Medical, Surgical, and Family History reviewed and updated in chart.    Reviewed all medications by prescribing practitioner or clinical pharmacist (such as prescriptions, OTCs, herbal therapies and supplements) and documented in the medical record.    HPI  History of Present Illness  David Medina \"Tea\" is an 80 year old female who presents for a wellness visit and management of allergies.    She experiences sinus drainage as the primary symptom of her allergies. No breathing problems occur unless she consumes foods she is allergic to, such as tomatoes. She is under the care of an allergist and is on a regimen of 1 mg of prednisone every other day, which is being tapered slowly. This treatment is primarily for a rash she experienced in the past, which has not recurred recently. The rash initially appeared after taking ibandronate, and it tends to return when she discontinues prednisone.    Her recent blood work showed low hemoglobin levels, but she has not experienced any symptoms such as shortness of breath or fatigue. No bleeding, dark stools, or changes in bowel habits. She has not had a colonoscopy and is unsure about the timing for a Cologuard test, as her last one was in October 2022. Her diet includes fruits and vegetables most of the time.    She does not smoke, consume alcohol, or use illicit drugs. Her caffeine intake is limited to a cup of tea in the morning and a Mountain Dew at lunchtime. No issues with memory, hearing, or vision, and her home environment is safe with good lighting. She has not experienced any falls in the past six months. Occasionally, she experiences swelling in her feet in the evenings.    Recent lab results from March 13th showed a tryptase level of 11.5, a thyroid peroxidase antibody level of 206, and a TSH level of 1.75, all within acceptable ranges. Her " hemoglobin was noted to be 10.3, with a hematocrit of 33. Other lab values, including cholesterol, liver function tests, and kidney function tests, were within normal limits.       Patient Care Team:  Jimenez Groves DO as PCP - General       12 Systems have been reviewed as follows.  Constitutional: Fever, weight gain, weight loss, appetite change, night sweats, fatigue, chills.  Eyes : blurry, double vision, vision, loss, tearing, redness, pain, sensitivity to light, glaucoma.  Ears, nose, mouth, and throat: Hearing loss, ringing in the ears, ear pain, nasal congestion, nasal drainage, nosebleeds, mouth, throat, irritation tooth problem.  Cardiovascular :chest pain, pressure, heart racing, palpitations, sweating, leg swelling, high or low blood pressure  Pulmonary: Cough, yellow or green sputum, blood and sputum, shortness of breath, wheezing  Gastrointestinal: Nausea, vomiting, diarrhea, constipation, pain, blood in stool, or vomitus, heartburn, difficulty swallowing  Genitourinary: incontinence, abnormal bleeding, abnormal discharge, urinary frequency, urinary hesitancy, pain, impotence sexual problem, infection, urinary retention  Musculoskeletal: Pain, stiffness, joint, redness or warmth, arthritis, back pain, weakness, muscle wasting, sprain or fracture  Neuro: Weight weakness, dizziness, change in voice, change in taste change in vision, change in hearing, loss, or change of sensation, trouble walking, balance problems coordination problems, shaking, speech problem  Endocrine , cold or heat intolerance, blood sugar problem, weight gain or loss missed periods hot flashes, sweats, change in body hair, change in libido, increased thirst, increased urination  Heme/lymph: Swelling, bleeding, problem anemia, bruising, enlarged lymph nodes  Allergic/immunologic: H. plus nasal drip, watery itchy eyes, nasal drainage, immunosuppressed  The above were reviewed and noted negative except as noted in HPI and Problem  "List.    Objective   Vitals:  /70 (BP Location: Right arm, Patient Position: Sitting, BP Cuff Size: Large adult)   Pulse 83   Temp 36.4 °C (97.6 °F) (Temporal)   Resp 16   Ht 1.626 m (5' 4\")   Wt 64 kg (141 lb)   SpO2 93%   BMI 24.20 kg/m²         Physical Exam  GENERAL: Alert, cooperative, well developed, no acute distress.  HEENT: Normocephalic, normal oropharynx, moist mucous membranes.  CHEST: Clear to auscultation bilaterally, no wheezes, rhonchi, or crackles.  CARDIOVASCULAR: Normal heart rate and rhythm, S1 and S2 normal without murmurs, rubs, or gallops.  ABDOMEN: Soft, non-tender, non-distended, without organomegaly, normal bowel sounds.  EXTREMITIES: No cyanosis or edema.  NEUROLOGICAL: Cranial nerves grossly intact, moves all extremities without gross motor or sensory deficit.     Constitutional: Well developed, well nourished, alert and in no acute distress   Eyes: Normal external exam. Pupils equally round and reactive to light with normal accommodation and extraocular movements intact.  Neck: Supple, no lymphadenopathy or masses.   Cardiovascular: Regular rate and rhythm, normal S1 and S2, no murmurs, gallops, or rubs. Radial pulses normal. No peripheral edema.  Pulmonary: No respiratory distress, lungs clear to auscultation bilaterally. No wheezes, rhonchi, rales.  Abdomen: soft,non tender, non distended, without masses or HSM  Skin: Warm, well perfused, normal skin turgor and color.   Neurologic: Cranial nerves II-XII grossly intact.   Psychiatric: Mood calm and affect normal  Musculoskeletal: Moving all extremities without restriction  The above were reviewed and noted negative except as noted in HPI and Problem List.    Problem List Items Addressed This Visit       Dyslipidemia    Fatigue    Relevant Orders    Vitamin B12    Folate    POCT hemoglobin manually resulted (Completed)    CBC and Auto Differential    Osteoporosis     Other Visit Diagnoses         Medicare annual wellness " visit, subsequent    -  Primary      Seasonal allergic rhinitis due to pollen        Relevant Medications    triamcinolone acetonide (Kenalog-40) injection 80 mg      Iron deficiency anemia secondary to inadequate dietary iron intake        Relevant Orders    Cologuard® colon cancer screening    Iron and TIBC    POCT hemoglobin manually resulted (Completed)    CBC and Auto Differential      Colon cancer screening        Relevant Orders    Cologuard® colon cancer screening             Assessment & Plan  Medicare annual wellness visit, subsequent         Seasonal allergic rhinitis due to pollen    Orders:    triamcinolone acetonide (Kenalog-40) injection 80 mg    Iron deficiency anemia secondary to inadequate dietary iron intake    Orders:    Cologuard® colon cancer screening; Future    Iron and TIBC; Future    POCT hemoglobin manually resulted    CBC and Auto Differential; Future    Colon cancer screening    Orders:    Cologuard® colon cancer screening; Future    Fatigue, unspecified type    Orders:    Vitamin B12; Future    Folate; Future    POCT hemoglobin manually resulted    CBC and Auto Differential; Future    Dyslipidemia         Age-related osteoporosis without current pathological fracture            Results  LABS  Tryptase: 11.5 (03/13/2025)  Thyroid peroxidase antibody: 206 (03/13/2025)  HbA1c: 4.7% (03/13/2025)  Vitamin D: 44 ng/mL (03/13/2025)  T4: 9.5 µg/dL (03/13/2025)  TSH: 1.75 µIU/mL (03/13/2025)  Cholesterol: 137 mg/dL (03/13/2025)  HDL: 54 mg/dL (03/13/2025)  Triglycerides: 122 mg/dL (03/13/2025)  LDL: 63 mg/dL (03/13/2025)  WBC: 7.9 x10^3/µL (03/13/2025)  Hb: 10.3 g/dL (03/13/2025)  Hct: 33% (03/13/2025)  PLT: 305 x10^3/µL (03/13/2025)  Glucose: 98 mg/dL (03/13/2025)  BUN: 23 mg/dL (03/13/2025)  Cr: 0.95 mg/dL (03/13/2025)  GFR: 61 mL/min/1.73m^2 (03/13/2025)  Na: 142 mmol/L (03/13/2025)  K: 4.3 mmol/L (03/13/2025)  Ca: 9.9 mg/dL (03/13/2025)  AST: 29 U/L (03/13/2025)  ALT: 18 U/L (03/13/2025)        Assessment & Plan  Allergic rhinitis  Chronic allergic rhinitis with sinus drainage, exacerbated by specific allergens such as tomatoes. Managed with allergist and tapering prednisone for associated rash. No seasonal allergies to grass or pollen.  - Administer Kenalog injection    Anemia  Mild anemia with hemoglobin at 10.3 and hematocrit at 33. Asymptomatic with no fatigue, dyspnea, or bleeding. No history of melena or gastrointestinal bleeding. Dietary intake of iron-rich foods discussed. Cologuard test considered due to low hemoglobin, pending approval. Colonoscopy discussed but declined unless necessary.  - Order B12, folic acid, and iron studies  - Consider Cologuard test if approved  - Encourage consumption of green leafy vegetables and other iron-rich foods    Wellness Visit  Routine wellness visit covering general health maintenance, including vaccinations, screenings, and lifestyle habits. Lab results and overall health status reviewed. Vaccinations current except for RSV; RSV vaccination discussed. Eye exam and bone density are current. No tobacco, alcohol, or illicit drug use. Minimal caffeine intake. Safe home environment with no recent falls. Memory and hearing intact.  - Schedule follow-up in four months    Goals of Care  Advanced directives discussed. She has a living will and has designated Heather as her health agent for ease of contact.         Advance Directives Discussion  16 - 20 minutes were spent discussing Advanced Care Planning (including a Living Will, Medical Power Of , as well as specific end of life choices and/or directives). The details of that discussion were documented in Advanced Directives Discussion section of the medical record.

## 2025-05-07 NOTE — PATIENT INSTRUCTIONS
VISIT SUMMARY:  Today, you had a wellness visit to review your general health and manage your allergies. We discussed your current symptoms, recent lab results, and overall well-being.    YOUR PLAN:  -ALLERGIC RHINITIS: Allergic rhinitis is an inflammation of the nasal passages caused by allergens, leading to symptoms like sinus drainage. You are managing this with your allergist and tapering prednisone for a past rash. Today, you received a Kenalog injection to help with your symptoms.    -ANEMIA: Anemia is a condition where you have fewer red blood cells than normal, which can cause fatigue and weakness. Your recent blood work showed mild anemia, but you have no symptoms. We discussed increasing your intake of iron-rich foods, and we will order B12, folic acid, and iron studies. A Cologuard test may be considered if approved.    -WELLNESS VISIT: This was a routine visit to review your overall health, including vaccinations, screenings, and lifestyle habits. Your lab results were mostly normal, and we discussed the importance of maintaining a healthy diet and lifestyle. Your vaccinations are up to date except for the RSV vaccine, which we discussed. Your home environment is safe, and you have not experienced any recent falls.    -GOALS OF CARE: We discussed your advanced directives, and you confirmed that you have a living will and have designated Heather as your health agent for ease of contact.    INSTRUCTIONS:  Please follow up in four months for your next wellness visit. In the meantime, continue to manage your allergies with your allergist, and increase your intake of iron-rich foods. We will also conduct B12, folic acid, and iron studies, and consider a Cologuard test if it is approved. VISIT SUMMARY:  Today, you had a wellness visit to review your general health and manage your allergies. We discussed your current symptoms, recent lab results, and overall well-being.    YOUR PLAN:  -ALLERGIC RHINITIS:  Allergic rhinitis is an inflammation of the nasal passages caused by allergens, leading to symptoms like sinus drainage. You are managing this with your allergist and tapering prednisone for a past rash. Today, you received a Kenalog injection to help with your symptoms.    -ANEMIA: Anemia is a condition where you have fewer red blood cells than normal, which can cause fatigue and weakness. Your recent blood work showed mild anemia, but you have no symptoms. We discussed increasing your intake of iron-rich foods, and we will order B12, folic acid, and iron studies. A Cologuard test may be considered if approved.    -WELLNESS VISIT: This was a routine visit to review your overall health, including vaccinations, screenings, and lifestyle habits. Your lab results were mostly normal, and we discussed the importance of maintaining a healthy diet and lifestyle. Your vaccinations are up to date except for the RSV vaccine, which we discussed. Your home environment is safe, and you have not experienced any recent falls.    -GOALS OF CARE: We discussed your advanced directives, and you confirmed that you have a living will and have designated Heather as your health agent for ease of contact.    INSTRUCTIONS:  Please follow up in four months for your next wellness visit. In the meantime, continue to manage your allergies with your allergist, and increase your intake of iron-rich foods. We will also conduct B12, folic acid, and iron studies, and consider a Cologuard test if it is approved.

## 2025-05-07 NOTE — ASSESSMENT & PLAN NOTE
Orders:    Vitamin B12; Future    Folate; Future    POCT hemoglobin manually resulted    CBC and Auto Differential; Future

## 2025-05-08 LAB
BASOPHILS # BLD AUTO: 56 CELLS/UL (ref 0–200)
BASOPHILS NFR BLD AUTO: 0.5 %
EOSINOPHIL # BLD AUTO: 67 CELLS/UL (ref 15–500)
EOSINOPHIL NFR BLD AUTO: 0.6 %
ERYTHROCYTE [DISTWIDTH] IN BLOOD BY AUTOMATED COUNT: 12.4 % (ref 11–15)
FOLATE SERPL-MCNC: 17 NG/ML
HCT VFR BLD AUTO: 30.7 % (ref 35–45)
HGB BLD-MCNC: 9.6 G/DL (ref 11.7–15.5)
IRON SATN MFR SERPL: 37 % (CALC) (ref 16–45)
IRON SERPL-MCNC: 100 MCG/DL (ref 45–160)
LYMPHOCYTES # BLD AUTO: 2042 CELLS/UL (ref 850–3900)
LYMPHOCYTES NFR BLD AUTO: 18.4 %
MCH RBC QN AUTO: 29 PG (ref 27–33)
MCHC RBC AUTO-ENTMCNC: 31.3 G/DL (ref 32–36)
MCV RBC AUTO: 92.7 FL (ref 80–100)
MONOCYTES # BLD AUTO: 755 CELLS/UL (ref 200–950)
MONOCYTES NFR BLD AUTO: 6.8 %
NEUTROPHILS # BLD AUTO: 8181 CELLS/UL (ref 1500–7800)
NEUTROPHILS NFR BLD AUTO: 73.7 %
PLATELET # BLD AUTO: 289 THOUSAND/UL (ref 140–400)
PMV BLD REES-ECKER: 10.9 FL (ref 7.5–12.5)
RBC # BLD AUTO: 3.31 MILLION/UL (ref 3.8–5.1)
TIBC SERPL-MCNC: 272 MCG/DL (CALC) (ref 250–450)
VIT B12 SERPL-MCNC: 418 PG/ML (ref 200–1100)
WBC # BLD AUTO: 11.1 THOUSAND/UL (ref 3.8–10.8)

## 2025-05-12 DIAGNOSIS — J01.01 ACUTE RECURRENT MAXILLARY SINUSITIS: ICD-10-CM

## 2025-05-12 DIAGNOSIS — L95.8 URTICARIAL VASCULITIS: ICD-10-CM

## 2025-05-12 DIAGNOSIS — L50.9 URTICARIA: ICD-10-CM

## 2025-05-12 RX ORDER — PREDNISONE 2.5 MG/1
TABLET ORAL
Qty: 42 TABLET | Refills: 0 | Status: SHIPPED | OUTPATIENT
Start: 2025-05-12

## 2025-05-15 RX ORDER — AMOXICILLIN AND CLAVULANATE POTASSIUM 875; 125 MG/1; MG/1
1 TABLET, FILM COATED ORAL 2 TIMES DAILY
Qty: 20 TABLET | Refills: 0 | OUTPATIENT
Start: 2025-05-15 | End: 2025-05-25

## 2025-05-15 RX ORDER — PREDNISONE 50 MG/1
50 TABLET ORAL DAILY
Qty: 7 TABLET | Refills: 0 | OUTPATIENT
Start: 2025-05-15 | End: 2025-05-22

## 2025-05-17 LAB — NONINV COLON CA DNA+OCC BLD SCRN STL QL: NEGATIVE

## 2025-06-03 DIAGNOSIS — I25.10 CORONARY ARTERY DISEASE, UNSPECIFIED VESSEL OR LESION TYPE, UNSPECIFIED WHETHER ANGINA PRESENT, UNSPECIFIED WHETHER NATIVE OR TRANSPLANTED HEART: ICD-10-CM

## 2025-06-03 RX ORDER — NITROGLYCERIN 0.4 MG/1
0.4 TABLET SUBLINGUAL EVERY 5 MIN PRN
Qty: 25 TABLET | Refills: 2 | Status: SHIPPED | OUTPATIENT
Start: 2025-06-03

## 2025-06-03 NOTE — TELEPHONE ENCOUNTER
Recent Visits  Date Type Provider Dept   05/06/25 Office Visit Jimenez Groves DO [facility]   Showing recent visits within past 180 days and meeting all other requirements  Future Appointments  No visits were found meeting these conditions.  Showing future appointments within next 90 days and meeting all other requirements

## 2025-06-06 DIAGNOSIS — E03.9 ACQUIRED HYPOTHYROIDISM: ICD-10-CM

## 2025-06-06 DIAGNOSIS — J00 ACUTE RHINITIS: ICD-10-CM

## 2025-06-06 NOTE — TELEPHONE ENCOUNTER
Recent Visits  Date Type Provider Dept   05/06/25 Office Visit Jimenez Groves DO [facility]   Showing recent visits within past 180 days and meeting all other requirements  Future Appointments  No visits were found meeting these conditions.  Showing future appointments within next 90 days and meeting all other requirements

## 2025-06-07 RX ORDER — LEVOTHYROXINE SODIUM 100 UG/1
100 TABLET ORAL
Qty: 90 TABLET | Refills: 1 | Status: SHIPPED | OUTPATIENT
Start: 2025-06-07

## 2025-06-07 RX ORDER — MONTELUKAST SODIUM 10 MG/1
10 TABLET ORAL DAILY
Qty: 90 TABLET | Refills: 1 | Status: SHIPPED | OUTPATIENT
Start: 2025-06-07

## 2025-06-09 ENCOUNTER — APPOINTMENT (OUTPATIENT)
Dept: UROLOGY | Facility: CLINIC | Age: 80
End: 2025-06-09
Payer: MEDICARE

## 2025-06-11 ENCOUNTER — APPOINTMENT (OUTPATIENT)
Dept: UROLOGY | Facility: CLINIC | Age: 80
End: 2025-06-11
Payer: MEDICARE

## 2025-06-11 VITALS
BODY MASS INDEX: 23.39 KG/M2 | WEIGHT: 137 LBS | DIASTOLIC BLOOD PRESSURE: 78 MMHG | SYSTOLIC BLOOD PRESSURE: 138 MMHG | HEIGHT: 64 IN | HEART RATE: 83 BPM

## 2025-06-11 DIAGNOSIS — Z87.898 HISTORY OF RIGHT FLANK PAIN: Primary | ICD-10-CM

## 2025-06-11 PROCEDURE — 1036F TOBACCO NON-USER: CPT | Performed by: NURSE PRACTITIONER

## 2025-06-11 PROCEDURE — 99212 OFFICE O/P EST SF 10 MIN: CPT | Performed by: NURSE PRACTITIONER

## 2025-06-11 PROCEDURE — 1126F AMNT PAIN NOTED NONE PRSNT: CPT | Performed by: NURSE PRACTITIONER

## 2025-06-11 PROCEDURE — 1160F RVW MEDS BY RX/DR IN RCRD: CPT | Performed by: NURSE PRACTITIONER

## 2025-06-11 PROCEDURE — 1159F MED LIST DOCD IN RCRD: CPT | Performed by: NURSE PRACTITIONER

## 2025-06-11 ASSESSMENT — PAIN SCALES - GENERAL: PAINLEVEL_OUTOF10: 0-NO PAIN

## 2025-06-11 NOTE — ASSESSMENT & PLAN NOTE
Resolved.  Had a lengthy discussion regarding CT findings a year ago.  No recurrence of right flank pain or hematuria.  Review of chart shows no microhematuria.  No UTI in the past year per patient.  Completely asymptomatic.  Plan for follow-up as needed.  If she should develop any recurrence of symptoms, request she follow-up sooner.  She agrees with plan of care.

## 2025-06-11 NOTE — PROGRESS NOTES
"  [facility] [place]  Outpatient Clinic Note        Subjective   Patient ID: David Medina \"Tea\" is a 80 y.o. female who presents for Blood in Urine.      History of Present Illness  David Baker\" is a 80 y.o. female who presents for 1 year follow-up.  Patient was seen after results of an abdomen and pelvis CT showed colonic diverticulosis without diverticulitis.  History of right flank pain and hematuria.  CT abdomen and pelvis on 5/20/2024 showed simple right renal cyst with the largest in the interpolar region measuring 12 mm.  No hydroureteronephrosis or nephro ureterolithiasis.  She has had no recurrence in UTIs .  Denies pain.  Denies frequency and urgency.  Denies dysuria or hematuria.  Nocturia 0-1.  No prior history of renal stones.    Past Medical & Surgical History  Medical History[1]   Surgical History[2]     Review of Systems:    A 12 point review of systems was completed and otherwise negative unless noted in the HPI    Physical Exam                                                                                                                      General: Well developed, well nourished, alert and cooperative, appears in no acute distress  Eyes: Non-injected conjunctiva, sclera clear, no proptosis  Cardiac: Extremities are warm and well perfused. No edema, cyanosis or pallor.   Lungs: Breathing is easy, non-labored. Speaking in clear and complete sentences. Normal diaphragmatic movement.  Abdomen: soft NT with BSP, no CVA tenderness  MSK: Ambulatory with steady gait, unassisted  Neuro: alert and oriented to person, place and time  Psych: Demonstrates good judgement and reason, without abnormal affect or abnormal behaviors.  Skin: no obvious lesions, no rashes    Objective     LABS  No results found for this visit on 06/11/25.     Imaging        Problem List Items Addressed This Visit       History of right flank pain - Primary    Current Assessment & Plan   Resolved.  Had a lengthy discussion " OFFICE VISIT NOTE  Mable Jordan   89 y.o. female            Assessment/Plan for  Mable Jordan is a 89 y.o. female.  No Patient Care Coordination Note on file.       There are no diagnoses linked to this encounter.   1.  Progressive dementia exacerbated by recent loss of .  Will Rx Aricept.  Because of some agitation consider low-dose Seroquel  2.  Ischemic heart disease stable  3.  Hypertension adequate control  4.  Diabetes check labs    Plan:  Same meds  Add Aricept 5 at bedtime  Clinic visit 6 months  Call any issues  Laboratory    There are no Patient Instructions on file for this visit.    Diagnoses and all orders for this visit:    CAD (coronary artery disease)  -     HM1(CBC and Differential)  -     HM1 (CBC with Diff)    Diabetes mellitus, type 2  -     Basic Metabolic Panel  -     Hepatic Profile  -     Glycosylated Hemoglobin A1c    Essential hypertension    Memory loss  -     donepezil (ARICEPT) 5 MG tablet; Take 1 tablet (5 mg total) by mouth at bedtime.  Dispense: 30 tablet; Refill: 2  -     Vitamin B12      Medications after visit  Current Outpatient Prescriptions   Medication Sig Dispense Refill     amLODIPine (NORVASC) 10 MG tablet TAKE 1 TABLET BY MOUTH EVERY DAY 90 tablet 0     dorzolamide-timolol (COSOPT) 22.3-6.8 mg/mL ophthalmic solution Administer 1 drop to both eyes 2 (two) times a day. one drop in each eye twice daily  Indications: Open Angle Glaucoma       furosemide (LASIX) 20 MG tablet Take 2 tablets (40 mg total) by mouth daily. (Patient taking differently: Take 20 mg by mouth daily. ) 180 tablet 3     glipiZIDE (GLUCOTROL) 5 MG tablet TAKE 1 TABLET(5 MG) BY MOUTH DAILY 90 tablet 0     ibuprofen (ADVIL,MOTRIN) 200 MG tablet Take 200 mg by mouth every 6 (six) hours as needed.       lisinopril (PRINIVIL,ZESTRIL) 10 MG tablet TAKE 1 TABLET BY MOUTH DAILY 90 tablet 3     loperamide (IMODIUM) 2 mg capsule Take 2 mg by mouth as needed for diarrhea.       LUMIGAN 0.01 % Drop  INSTILL 1 DROP INTO EACH EYE once a day  5     metoprolol tartrate (LOPRESSOR) 50 MG tablet Take 0.5 tablets (25 mg total) by mouth 2 (two) times a day. 200 tablet 3     potassium chloride (KLOR-CON) 10 MEQ CR tablet TAKE 1 TABLET(10 MEQ) BY MOUTH TWICE DAILY 180 tablet 3     donepezil (ARICEPT) 5 MG tablet Take 1 tablet (5 mg total) by mouth at bedtime. 30 tablet 2     latanoprost (XALATAN) 0.005 % ophthalmic solution Administer 1 drop to both eyes bedtime.       LORazepam (ATIVAN) 0.5 MG tablet Take 1 tablet (0.5 mg total) by mouth at bedtime as needed for anxiety. 20 tablet 0     No current facility-administered medications for this visit.                       Eleazar Alvarez MD  Internal medicine  [facility]  [phone]  [email or web address]      This is an electronically verified report by Eleazar Alvarez M.D.  (Note created with Dragon voice recognition and unintended spelling errors and word substitutions may occur)               Subjective:   Chief Complaint:  Diabetes; Hypertension; and Follow-up    Have not seen for 1 year    Patient has memory loss  Lives with daughter-in-law/son.  Receiving excellent care.    recently- 64 years    She has no complaints    ISCHEMIC HEART DISEASE- Other than reported, the patient is not having any angina, chest pain, epigastric pain, dyspnea, palpitation, lightheadedness, syncope, excessive fatigue, exertional diaphoresis.-The patient is taking medication as prescribed. No change in nitroglycerin usage    Diabetes mellitus type 2 without polydipsia polyuria.  Her weight is stable she is not having any significant hypoglycemia    Review of Systems:     Extensive 10-point review of systems was performed. Please see the HPI for problem specific pertinent review of systems.     Patient does note no bowel issues    Otherwise, the following systems are noncontributory including constitutional, eyes, ears, nose and throat,  regarding CT findings a year ago.  No recurrence of right flank pain or hematuria.  Review of chart shows no microhematuria.  No UTI in the past year per patient.  Completely asymptomatic.  Plan for follow-up as needed.  If she should develop any recurrence of symptoms, request she follow-up sooner.  She agrees with plan of care.                      KATHY Wharton-CNP 06/11/25 10:05 AM          [1]   Past Medical History:  Diagnosis Date    Disorder of thyroid, unspecified 12/26/2021    Thyroid dysfunction   [2]   Past Surgical History:  Procedure Laterality Date    OTHER SURGICAL HISTORY  05/16/2022    Cardiac catheterization    OTHER SURGICAL HISTORY  05/16/2022    Ankle surgery    OTHER SURGICAL HISTORY  05/16/2022    Tonsillectomy    OTHER SURGICAL HISTORY  05/16/2022    Rhinoplasty    OTHER SURGICAL HISTORY  05/16/2022    Subtotal thyroidectomy    OTHER SURGICAL HISTORY  05/16/2022    Hysterectomy      cardiovascular, respiratory, gastrointestinal, genitourinary, musculoskeletal,neurological, skin and/or breast, endocrine, hematologic/lymph, allergic/immunologic and psychiatric.              Medications:  Current Outpatient Prescriptions on File Prior to Visit   Medication Sig     amLODIPine (NORVASC) 10 MG tablet TAKE 1 TABLET BY MOUTH EVERY DAY     dorzolamide-timolol (COSOPT) 22.3-6.8 mg/mL ophthalmic solution Administer 1 drop to both eyes 2 (two) times a day. one drop in each eye twice daily  Indications: Open Angle Glaucoma     furosemide (LASIX) 20 MG tablet Take 2 tablets (40 mg total) by mouth daily. (Patient taking differently: Take 20 mg by mouth daily. )     glipiZIDE (GLUCOTROL) 5 MG tablet TAKE 1 TABLET(5 MG) BY MOUTH DAILY     ibuprofen (ADVIL,MOTRIN) 200 MG tablet Take 200 mg by mouth every 6 (six) hours as needed.     lisinopril (PRINIVIL,ZESTRIL) 10 MG tablet TAKE 1 TABLET BY MOUTH DAILY     LUMIGAN 0.01 % Drop INSTILL 1 DROP INTO EACH EYE once a day     metoprolol tartrate (LOPRESSOR) 50 MG tablet Take 0.5 tablets (25 mg total) by mouth 2 (two) times a day.     potassium chloride (KLOR-CON) 10 MEQ CR tablet TAKE 1 TABLET(10 MEQ) BY MOUTH TWICE DAILY     latanoprost (XALATAN) 0.005 % ophthalmic solution Administer 1 drop to both eyes bedtime.     LORazepam (ATIVAN) 0.5 MG tablet Take 1 tablet (0.5 mg total) by mouth at bedtime as needed for anxiety.     [DISCONTINUED] acetaminophen (TYLENOL) 500 MG tablet Take 500 mg by mouth as needed for pain.     No current facility-administered medications on file prior to visit.             Allergies:  Allergies   Allergen Reactions     Darvocet A500 [Propoxyphene N-Acetaminophen] Other (See Comments)     DYSPHORIA       Omeprazole Diarrhea     Sulfa (Sulfonamide Antibiotics)      Tramadol      Confusion       PSFHx: Tobacco Status:  She  reports that she has never smoked. She has never used smokeless tobacco.   Alcohol Status:    History   Alcohol Use      "1.8 oz/week     3 Glasses of wine per week       reports that she has never smoked. She has never used smokeless tobacco. She reports that she drinks about 1.8 oz of alcohol per week  She reports that she does not use illicit drugs.    Objective:    Pulse 76  Ht 5' 2\" (1.575 m)  LMP  (LMP Unknown)  SpO2 98%  Breastfeeding? No  Weight:   Wt Readings from Last 3 Encounters:   10/17/16 118 lb 0.6 oz (53.5 kg)   07/14/16 117 lb 0.6 oz (53.1 kg)   05/23/16 123 lb 8 oz (56 kg)     BP Readings from Last 3 Encounters:   10/17/16 164/80   07/14/16 168/62   06/04/16 140/68     Weight-stable    General-appears well, no acute distress.  In wheelchair  Memory poor-needs to be reoriented to place throughout exam  Clear lungs  Pulse regular some premature beats-history of PACs  No murmur  No edema        Review of clinical lab tests  Lab Results   Component Value Date    WBC 14.4 (H) 10/17/2016    HGB 17.4 (H) 10/17/2016    HCT 50.3 (H) 10/17/2016     10/17/2016    ALT 12 07/14/2016    AST 17 07/14/2016     10/17/2016    K 3.4 (L) 10/17/2016     10/17/2016    CREATININE 1.25 (H) 10/17/2016    BUN 12 10/17/2016    CO2 28 10/17/2016    HGBA1C 5.6 10/17/2016       Glucose   Date/Time Value Ref Range Status   10/17/2016 03:08  74 - 125 mg/dL Final   07/14/2016 01:58  (H) 74 - 125 mg/dL Final   05/23/2016 10:15  (H) 70 - 125 mg/dL Final   04/13/2016 04:11  74 - 125 mg/dL Final   01/13/2016 12:11  74 - 125 mg/dL Final   10/28/2013 11:20  (H) 70 - 125 mg/dL Final     Comment:          Fasting Glucose reference range is 70-99 mg/dL per       American Diabetes Association (ADA) guidelines.     No results found for this or any previous visit (from the past 24 hour(s)).    RADIOLOGY: No results found.    Review of recent consultation-     "

## 2025-07-09 ENCOUNTER — APPOINTMENT (OUTPATIENT)
Dept: CARDIOLOGY | Facility: CLINIC | Age: 80
End: 2025-07-09
Payer: MEDICARE

## 2025-07-09 VITALS
SYSTOLIC BLOOD PRESSURE: 112 MMHG | WEIGHT: 136 LBS | DIASTOLIC BLOOD PRESSURE: 70 MMHG | HEIGHT: 64 IN | HEART RATE: 72 BPM | BODY MASS INDEX: 23.22 KG/M2

## 2025-07-09 DIAGNOSIS — E78.5 DYSLIPIDEMIA: ICD-10-CM

## 2025-07-09 DIAGNOSIS — I25.10 CORONARY ARTERY DISEASE INVOLVING NATIVE CORONARY ARTERY OF NATIVE HEART, UNSPECIFIED WHETHER ANGINA PRESENT: Primary | ICD-10-CM

## 2025-07-09 DIAGNOSIS — D64.9 ANEMIA, UNSPECIFIED TYPE: ICD-10-CM

## 2025-07-09 DIAGNOSIS — Z78.9 NEVER SMOKED TOBACCO: ICD-10-CM

## 2025-07-09 PROCEDURE — 1036F TOBACCO NON-USER: CPT | Performed by: INTERNAL MEDICINE

## 2025-07-09 PROCEDURE — 1159F MED LIST DOCD IN RCRD: CPT | Performed by: INTERNAL MEDICINE

## 2025-07-09 PROCEDURE — 99213 OFFICE O/P EST LOW 20 MIN: CPT | Performed by: INTERNAL MEDICINE

## 2025-07-09 RX ORDER — PREDNISONE 1 MG/1
1 TABLET ORAL EVERY OTHER DAY
COMMUNITY

## 2025-07-09 NOTE — PROGRESS NOTES
"HPI  Patient is in the office for follow-up for mild coronary disease confirmed by cardiac catheterization in 2007 with no indication of ischemia based on stress test in 2018.  She continue to follow with her PCP Dr. Groves and her recent blood work revealed anemia hemoglobin just over 9 g/dL with normal iron, normal vitamin B12 and folic acid.  She has no chronic illnesses to explain the anemia.  She has no symptoms related to anemia such as fatigue and lack of stamina.  She exercise on regular basis, her vital signs at home are excellent.  She brought readings from home.  Her weight is identical to the last office visit a year ago.  Examination was essentially normal    ASSESSMENT / recommendations.     1. Minimal coronary artery disease by cardiac catheter in 2007, at this time there was 30% LAD lesion. 2018 stress test and echocardiogram were normal, risk factors have been controlled, medical therapy with left unchanged  2-hyperlipidemia on atorvastatin 80 mg daily plus Zetia 10 mg daily. LDL cholesterol is 63 mg/dL.  Patient follows low-fat diet  3-status post partial thyroidectomy, currently on supplement but is hyperthyroid and her PCP is adjusting her medications.  4-mild anemia, hemoglobin 9.6 g/dL based on data May 2025.  This is not iron deficiency and not folic acid or B12 deficiency.  There is no obvious blood loss.  Patient is seeing her PCP in September 2025.  Advised her to consider seeing hematologist.  ROS     Review of system essentially normal    Vitals:    07/09/25 1010   BP: 112/70   BP Location: Left arm   Patient Position: Sitting   Pulse: 72   Weight: 61.7 kg (136 lb)   Height: 1.626 m (5' 4\")        Objective   Physical Exam    Allergies  Boniva [ibandronate]     Current Medications  Current Outpatient Medications   Medication Instructions    aspirin 81 mg EC tablet 1 tablet, Daily    atorvastatin (LIPITOR) 80 mg, oral, Nightly    Bacillus coagulans 250 million cell tablet,chewable Chew. "    benzonatate (TESSALON) 100 mg, 3 times daily PRN    cetirizine (ZYRTEC) 10 mg, oral, Every 12 hours    cholecalciferol (VITAMIN D3) 50 mcg, Daily    coenzyme Q-10 (CO Q-10) 100 mg, Daily    coffee/theanine/superoxide dis (NEURIVA DE-STRESS ORAL) Take by mouth.    cranberry fruit extract (CRANBERRY CONCENTRATE ORAL) Take by mouth.    dapsone 25 mg, oral, Nightly    docusate sodium (COLACE) 100 mg, Daily    ezetimibe (ZETIA) 10 mg, oral, Daily    famotidine (Pepcid) 40 mg tablet TAKE 1 TABLET BY MOUTH AS NEEDED AT BEDTIME FOR HEARTBURN    levothyroxine (SYNTHROID, LEVOXYL) 100 mcg, oral, Daily before breakfast    loratadine (CLARITIN) 10 mg, Daily    montelukast (SINGULAIR) 10 mg, oral, Daily    nitroglycerin (NITROSTAT) 0.4 mg, sublingual, Every 5 min PRN, May repeat every 5 minutes for up to 3 doses.    predniSONE (DELTASONE) 1 mg, Every other day                        Assessment/Plan   1. Coronary artery disease involving native coronary artery of native heart, unspecified whether angina present  Follow Up In Cardiology    Follow Up In Cardiology      2. Dyslipidemia        3. BMI 23.0-23.9, adult        4. Never smoked tobacco        5. Hypercholesterolemia                 Scribe Attestation  By signing my name below, Malu BOBBY LPN, Scribe   attest that this documentation has been prepared under the direction and in the presence of Madison Gallego MD.     Provider Attestation - Scribe documentation    All medical record entries made by the Scribe were at my direction and personally dictated by me. I have reviewed the chart and agree that the record accurately reflects my personal performance of the history, physical exam, discussion and plan.

## 2025-07-09 NOTE — LETTER
July 9, 2025     Jimenez Groves DO  [address]  [place] [address]    Patient: Tea Medina   YOB: 1945   Date of Visit: 7/9/2025       Dear Dr. Jimenez Groves DO:    Thank you for referring Tea Medina to me for evaluation. Below are my notes for this consultation.  If you have questions, please do not hesitate to call me. I look forward to following your patient along with you.       Sincerely,     Madison Gallego MD      CC: No Recipients  ______________________________________________________________________________________    HPI  Patient is in the office for follow-up for mild coronary disease confirmed by cardiac catheterization in 2007 with no indication of ischemia based on stress test in 2018.  She continue to follow with her PCP Dr. Groves and her recent blood work revealed anemia hemoglobin just over 9 g/dL with normal iron, normal vitamin B12 and folic acid.  She has no chronic illnesses to explain the anemia.  She has no symptoms related to anemia such as fatigue and lack of stamina.  She exercise on regular basis, her vital signs at home are excellent.  She brought readings from home.  Her weight is identical to the last office visit a year ago.  Examination was essentially normal    ASSESSMENT / recommendations.     1. Minimal coronary artery disease by cardiac catheter in 2007, at this time there was 30% LAD lesion. 2018 stress test and echocardiogram were normal, risk factors have been controlled, medical therapy with left unchanged  2-hyperlipidemia on atorvastatin 80 mg daily plus Zetia 10 mg daily. LDL cholesterol is 63 mg/dL.  Patient follows low-fat diet  3-status post partial thyroidectomy, currently on supplement but is hyperthyroid and her PCP is adjusting her medications.  4-mild anemia, hemoglobin 9.6 g/dL based on data May 2025.  This is not iron deficiency and not folic acid or B12 deficiency.  There is no obvious blood loss.  Patient is seeing her PCP  "in September 2025.  Advised her to consider seeing hematologist.  ROS     Review of system essentially normal    Vitals:    07/09/25 1010   BP: 112/70   BP Location: Left arm   Patient Position: Sitting   Pulse: 72   Weight: 61.7 kg (136 lb)   Height: 1.626 m (5' 4\")        Objective   Physical Exam    Allergies  Boniva [ibandronate]     Current Medications  Current Outpatient Medications   Medication Instructions   • aspirin 81 mg EC tablet 1 tablet, Daily   • atorvastatin (LIPITOR) 80 mg, oral, Nightly   • Bacillus coagulans 250 million cell tablet,chewable Chew.   • benzonatate (TESSALON) 100 mg, 3 times daily PRN   • cetirizine (ZYRTEC) 10 mg, oral, Every 12 hours   • cholecalciferol (VITAMIN D3) 50 mcg, Daily   • coenzyme Q-10 (CO Q-10) 100 mg, Daily   • coffee/theanine/superoxide dis (NEURIVA DE-STRESS ORAL) Take by mouth.   • cranberry fruit extract (CRANBERRY CONCENTRATE ORAL) Take by mouth.   • dapsone 25 mg, oral, Nightly   • docusate sodium (COLACE) 100 mg, Daily   • ezetimibe (ZETIA) 10 mg, oral, Daily   • famotidine (Pepcid) 40 mg tablet TAKE 1 TABLET BY MOUTH AS NEEDED AT BEDTIME FOR HEARTBURN   • levothyroxine (SYNTHROID, LEVOXYL) 100 mcg, oral, Daily before breakfast   • loratadine (CLARITIN) 10 mg, Daily   • montelukast (SINGULAIR) 10 mg, oral, Daily   • nitroglycerin (NITROSTAT) 0.4 mg, sublingual, Every 5 min PRN, May repeat every 5 minutes for up to 3 doses.   • predniSONE (DELTASONE) 1 mg, Every other day                        Assessment/Plan   1. Coronary artery disease involving native coronary artery of native heart, unspecified whether angina present  Follow Up In Cardiology    Follow Up In Cardiology      2. Dyslipidemia        3. BMI 23.0-23.9, adult        4. Never smoked tobacco        5. Hypercholesterolemia                 Scribe Attestation  By signing my name below, Malu BOBBY LPN, Scribe   attest that this documentation has been prepared under the direction and in the presence " of Madison Gallego MD.     Provider Attestation - Scribe documentation    All medical record entries made by the Scribe were at my direction and personally dictated by me. I have reviewed the chart and agree that the record accurately reflects my personal performance of the history, physical exam, discussion and plan.

## 2025-07-16 DIAGNOSIS — E03.9 ACQUIRED HYPOTHYROIDISM: ICD-10-CM

## 2025-07-16 DIAGNOSIS — E78.5 DYSLIPIDEMIA: ICD-10-CM

## 2025-07-16 DIAGNOSIS — D64.9 ANEMIA, UNSPECIFIED TYPE: Primary | ICD-10-CM

## 2025-07-22 ENCOUNTER — TELEPHONE (OUTPATIENT)
Dept: PRIMARY CARE | Facility: CLINIC | Age: 80
End: 2025-07-22
Payer: MEDICARE

## 2025-07-22 LAB
ALBUMIN SERPL-MCNC: 4.3 G/DL (ref 3.6–5.1)
ALP SERPL-CCNC: 53 U/L (ref 37–153)
ALT SERPL-CCNC: 21 U/L (ref 6–29)
ANION GAP SERPL CALCULATED.4IONS-SCNC: 6 MMOL/L (CALC) (ref 7–17)
AST SERPL-CCNC: 24 U/L (ref 10–35)
BASOPHILS # BLD AUTO: 49 CELLS/UL (ref 0–200)
BASOPHILS NFR BLD AUTO: 0.6 %
BILIRUB SERPL-MCNC: 0.9 MG/DL (ref 0.2–1.2)
BUN SERPL-MCNC: 21 MG/DL (ref 7–25)
CALCIUM SERPL-MCNC: 9.6 MG/DL (ref 8.6–10.4)
CHLORIDE SERPL-SCNC: 106 MMOL/L (ref 98–110)
CHOLEST SERPL-MCNC: 131 MG/DL
CHOLEST/HDLC SERPL: 2.4 (CALC)
CO2 SERPL-SCNC: 28 MMOL/L (ref 20–32)
CREAT SERPL-MCNC: 0.99 MG/DL (ref 0.6–0.95)
EGFRCR SERPLBLD CKD-EPI 2021: 57 ML/MIN/1.73M2
EOSINOPHIL # BLD AUTO: 284 CELLS/UL (ref 15–500)
EOSINOPHIL NFR BLD AUTO: 3.5 %
ERYTHROCYTE [DISTWIDTH] IN BLOOD BY AUTOMATED COUNT: 12.6 % (ref 11–15)
GLUCOSE SERPL-MCNC: 90 MG/DL (ref 65–99)
HCT VFR BLD AUTO: 32.4 % (ref 35–45)
HDLC SERPL-MCNC: 55 MG/DL
HGB BLD-MCNC: 9.9 G/DL (ref 11.7–15.5)
LDLC SERPL CALC-MCNC: 56 MG/DL (CALC)
LYMPHOCYTES # BLD AUTO: 2284 CELLS/UL (ref 850–3900)
LYMPHOCYTES NFR BLD AUTO: 28.2 %
MCH RBC QN AUTO: 29.8 PG (ref 27–33)
MCHC RBC AUTO-ENTMCNC: 30.6 G/DL (ref 32–36)
MCV RBC AUTO: 97.6 FL (ref 80–100)
MONOCYTES # BLD AUTO: 697 CELLS/UL (ref 200–950)
MONOCYTES NFR BLD AUTO: 8.6 %
NEUTROPHILS # BLD AUTO: 4787 CELLS/UL (ref 1500–7800)
NEUTROPHILS NFR BLD AUTO: 59.1 %
NONHDLC SERPL-MCNC: 76 MG/DL (CALC)
PLATELET # BLD AUTO: 295 THOUSAND/UL (ref 140–400)
PMV BLD REES-ECKER: 10.7 FL (ref 7.5–12.5)
POTASSIUM SERPL-SCNC: 4.5 MMOL/L (ref 3.5–5.3)
PROT SERPL-MCNC: 6.4 G/DL (ref 6.1–8.1)
RBC # BLD AUTO: 3.32 MILLION/UL (ref 3.8–5.1)
SODIUM SERPL-SCNC: 140 MMOL/L (ref 135–146)
TRIGL SERPL-MCNC: 113 MG/DL
TRYPTASE SERPL-MCNC: 11.4 MCG/L
TSH SERPL-ACNC: 3.13 MIU/L (ref 0.4–4.5)
WBC # BLD AUTO: 8.1 THOUSAND/UL (ref 3.8–10.8)

## 2025-07-29 ENCOUNTER — APPOINTMENT (OUTPATIENT)
Dept: ALLERGY | Facility: CLINIC | Age: 80
End: 2025-07-29
Payer: MEDICARE

## 2025-07-29 VITALS
DIASTOLIC BLOOD PRESSURE: 82 MMHG | HEIGHT: 64 IN | BODY MASS INDEX: 23.22 KG/M2 | HEART RATE: 83 BPM | WEIGHT: 136 LBS | OXYGEN SATURATION: 96 % | SYSTOLIC BLOOD PRESSURE: 132 MMHG | TEMPERATURE: 98.2 F

## 2025-07-29 DIAGNOSIS — R74.8 ELEVATED SERUM TRYPTASE: ICD-10-CM

## 2025-07-29 DIAGNOSIS — L50.8 CHRONIC URTICARIA: Primary | ICD-10-CM

## 2025-07-29 PROCEDURE — 1159F MED LIST DOCD IN RCRD: CPT | Performed by: ALLERGY & IMMUNOLOGY

## 2025-07-29 PROCEDURE — 99214 OFFICE O/P EST MOD 30 MIN: CPT | Performed by: ALLERGY & IMMUNOLOGY

## 2025-07-29 RX ORDER — MV-MIN/FA/VIT K/LUTEIN/ZEAXANT 200MCG-5MG
CAPSULE ORAL
COMMUNITY

## 2025-07-29 ASSESSMENT — ENCOUNTER SYMPTOMS
EYES NEGATIVE: 1
CARDIOVASCULAR NEGATIVE: 1
CONSTITUTIONAL NEGATIVE: 1
MUSCULOSKELETAL NEGATIVE: 1
GASTROINTESTINAL NEGATIVE: 1
RESPIRATORY NEGATIVE: 1

## 2025-07-29 NOTE — PROGRESS NOTES
"Patient ID: David Medina \"Dorothy" is a 80 y.o. female.     Chief Complaint: Follow up, last seen 4/2/25  History Of Present Illness  David Medina \"Dorothy" is a 80 y.o. female with PMx hives and steroid dependence presenting for follow up.     Still ok off Meloxicam.  Continues on Zyrtec, Pepcid, Dapsone and right now on off prednisone 1 mg daily for 8 days without hives.  She is on Allegra daily.  She also takes Singulair at bed. Did try off and felt like she had asthma symptoms, so restarted.  She occasionally reports that her scalp itches without hives, but minor.    Review of Systems   Constitutional: Negative.    HENT: Negative.     Eyes: Negative.    Respiratory: Negative.     Cardiovascular: Negative.    Gastrointestinal: Negative.    Musculoskeletal: Negative.    Skin: Negative.        Allergies  Boniva [ibandronate]    Past Medical History  She has a past medical history of Disorder of thyroid, unspecified (12/26/2021).    Family History  Family History   Family history unknown: Yes     Sister has severe asthma    Surgical History  She has a past surgical history that includes Other surgical history (05/16/2022); Other surgical history (05/16/2022); Other surgical history (05/16/2022); Other surgical history (05/16/2022); Other surgical history (05/16/2022); and Other surgical history (05/16/2022).    Social/Environmental History  She reports that she has never smoked. She has never used smokeless tobacco. She reports that she does not drink alcohol and does not use drugs.      MEDICATIONS  Current Outpatient Medications on File Prior to Visit   Medication Sig Dispense Refill    aspirin 81 mg EC tablet Take 1 tablet (81 mg) by mouth once daily.      atorvastatin (Lipitor) 80 mg tablet TAKE 1 TABLET BY MOUTH AT  BEDTIME 90 tablet 3    Bacillus coagulans 250 million cell tablet,chewable Chew.      benzonatate (Tessalon) 100 mg capsule Take 1 capsule (100 mg) by mouth 3 times a day as needed for cough. Do not " "crush or chew.      cetirizine (ZyrTEC) 10 mg tablet Take 1 tablet (10 mg) by mouth every 12 hours. 180 tablet 3    cholecalciferol (Vitamin D3) 50 MCG (2000 UT) tablet Take 1 tablet (50 mcg) by mouth once daily.      coenzyme Q-10 (Co Q-10) 100 mg capsule Take 1 capsule (100 mg) by mouth once daily.      coffee/theanine/superoxide dis (NEURIVA DE-STRESS ORAL) Take by mouth.      cranberry fruit extract (CRANBERRY CONCENTRATE ORAL) Take by mouth.      docusate sodium (Colace) 100 mg capsule Take 1 capsule (100 mg) by mouth once daily.      ezetimibe (Zetia) 10 mg tablet TAKE 1 TABLET BY MOUTH DAILY 90 tablet 3    famotidine (Pepcid) 40 mg tablet TAKE 1 TABLET BY MOUTH AS NEEDED AT BEDTIME FOR HEARTBURN 90 tablet 1    levothyroxine (Synthroid, Levoxyl) 100 mcg tablet TAKE 1 TABLET BY MOUTH ONCE  DAILY IN THE MORNING BEFORE A  MEAL 90 tablet 1    loratadine (Claritin) 10 mg tablet Take 1 tablet (10 mg) by mouth once daily.      montelukast (Singulair) 10 mg tablet TAKE 1 TABLET BY MOUTH ONCE  DAILY 90 tablet 1    nitroglycerin (Nitrostat) 0.4 mg SL tablet PLACE 1 TABLET (0.4 MG) UNDER THE TONGUE EVERY 5 MINUTES IF NEEDED FOR CHEST PAIN. MAY REPEAT EVERY 5 MINUTES FOR UP TO 3 DOSES. 25 tablet 2    dapsone 25 mg tablet TAKE 1 TABLET BY MOUTH ONCE  DAILY AT BEDTIME (Patient not taking: Reported on 7/29/2025) 90 tablet 3    mv-min-FA-vit K-lutein-zeaxant (PreserVision AREDS 2 Plus MV) 200 mcg-15 mcg- 5 mg-1 mg capsule Take by mouth.      predniSONE (Deltasone) 1 mg tablet Take 1 tablet (1 mg) by mouth every other day. (Patient not taking: Reported on 7/29/2025)       No current facility-administered medications on file prior to visit.     Physical Exam  Visit Vitals  /82   Pulse 83   Temp 36.8 °C (98.2 °F)   Ht 1.626 m (5' 4\")   Wt 61.7 kg (136 lb)   SpO2 96%   BMI 23.34 kg/m²   Smoking Status Never   BSA 1.67 m²       Wt Readings from Last 1 Encounters:   07/29/25 61.7 kg (136 lb)       Physical Exam    General: " "Well appearing, no acute distress  Head: Normocephalic, atraumatic, neck supple without lymphadenopathy  Eyes: EOMI, non-injected  Ears: TM's normal  Nose: No nasal crease, nares patent without drainage.  Throat: Normal dentition, upper partial no erythema  Heart: Regular rate and rhythm  Lungs: Clear to auscultation bilaterally, effort normal  Extremities: Moves all extremities symmetrically, no edema  Skin: No rashes/lesions and no hives  Psych: normal mood and affect    LAB RESULTS:  CBC:  Recent Labs     07/21/25  0745 05/07/25  0813 05/06/25  1108 03/13/25  1018   WBC 8.1 11.1*  --  7.9   HGB 9.9* 9.6* 9.6* 10.3*   HCT 32.4* 30.7*  --  33.0*    289  --  305   MCV 97.6 92.7  --  94.0   EOSABS 284 67  --  379       CMP:  Recent Labs     07/21/25  0745 03/13/25  1018 09/03/24  1055 08/01/24  1722 08/01/24  0816    142 140  --  141  141   K 4.5 4.3 4.3  --  4.5  4.5    106 105  --  107  107   CO2 28 29 26  --  26  28   ANIONGAP 6* 7 13  --  13  11   BUN 21 23 19  --  26*  26*   CREATININE 0.99* 0.95 1.09*  --  1.36*  1.28*   EGFR 57* 61 51*  --  39*  42*   MG  --   --   --  2.35 2.40     Recent Labs     07/21/25  0745 03/13/25  1018 08/01/24  0816   ALBUMIN 4.3 4.5 4.2   ALKPHOS 53 59 40   ALT 21 18 18   AST 24 29 22   BILITOT 0.9 0.8 0.5       ALLERGY:   Recent Labs     07/21/25  0745 05/07/25  0813 03/13/25  1018   EOSABS 284 67 379     Recent Labs     07/18/25  0750 03/13/25  1020 09/03/24  1055   TRYPTASE 11.4* 11.5* 10.9       IMMUNO:   Recent Labs     09/03/24  1055         IGM 40       HEME/ENDO:  Recent Labs     07/21/25  0745 05/07/25  0813 03/13/25  1018 08/01/24  0816   TSH 3.13  --  1.75 2.59   HGBA1C  --   --  4.7  --    IRONSAT  --  37  --   --          Assessment/Plan   Aretthuey \"Tea\" is an 81 yo woman with a history of elevated tryptase and hives. She has been weaned her steroids and feeling well without breakthrough hives other than some occasional feeling of " pruritus on the scalp.  She will try to discontinue her dapsone to see if this is related to her anemia. Continue daily antihistamine.  Follow up 3-4 months.        Kayleen Saeed DO

## 2025-07-31 DIAGNOSIS — L50.9 URTICARIA: ICD-10-CM

## 2025-07-31 RX ORDER — FAMOTIDINE 40 MG/1
TABLET, FILM COATED ORAL
Qty: 90 TABLET | Refills: 1 | Status: SHIPPED | OUTPATIENT
Start: 2025-07-31

## 2025-07-31 NOTE — TELEPHONE ENCOUNTER
Recent Visits  Date Type Provider Dept   05/06/25 Office Visit Jimenez Groves, DO [facility]   Showing recent visits within past 365 days and meeting all other requirements  Future Appointments  Date Type Provider Dept   09/05/25 Appointment Jimenez Groves, DO [facility]   Showing future appointments within next 90 days and meeting all other requirements

## 2025-08-05 ENCOUNTER — TELEPHONE (OUTPATIENT)
Dept: PRIMARY CARE | Facility: CLINIC | Age: 80
End: 2025-08-05
Payer: MEDICARE

## 2025-08-05 DIAGNOSIS — D64.9 ANEMIA, UNSPECIFIED TYPE: ICD-10-CM

## 2025-08-05 DIAGNOSIS — R79.89 RENAL AZOTEMIA: ICD-10-CM

## 2025-08-05 DIAGNOSIS — J01.01 ACUTE RECURRENT MAXILLARY SINUSITIS: Primary | ICD-10-CM

## 2025-08-05 NOTE — TELEPHONE ENCOUNTER
Pt is requesting antibiotic to treat sinus infection please. Informed pt of walk in-clinic, preferred to have message sent to [facility].    [facility]/pharmacy #[id number] [address]

## 2025-08-06 DIAGNOSIS — J01.00 ACUTE NON-RECURRENT MAXILLARY SINUSITIS: Primary | ICD-10-CM

## 2025-08-06 RX ORDER — AZITHROMYCIN 250 MG/1
TABLET, FILM COATED ORAL
Qty: 6 TABLET | Refills: 0 | Status: SHIPPED | OUTPATIENT
Start: 2025-08-06 | End: 2025-08-11

## 2025-08-10 RX ORDER — AMOXICILLIN AND CLAVULANATE POTASSIUM 875; 125 MG/1; MG/1
875 TABLET, FILM COATED ORAL 2 TIMES DAILY
Qty: 20 TABLET | Refills: 0 | Status: SHIPPED | OUTPATIENT
Start: 2025-08-10 | End: 2025-08-20

## 2025-08-13 LAB
ANION GAP SERPL CALCULATED.4IONS-SCNC: 12 MMOL/L (CALC) (ref 7–17)
BASOPHILS # BLD AUTO: 84 CELLS/UL (ref 0–200)
BASOPHILS NFR BLD AUTO: 0.9 %
BUN SERPL-MCNC: 21 MG/DL (ref 7–25)
BUN/CREAT SERPL: 20 (CALC) (ref 6–22)
CALCIUM SERPL-MCNC: 10.2 MG/DL (ref 8.6–10.4)
CHLORIDE SERPL-SCNC: 108 MMOL/L (ref 98–110)
CO2 SERPL-SCNC: 24 MMOL/L (ref 20–32)
CREAT SERPL-MCNC: 1.05 MG/DL (ref 0.6–0.95)
EGFRCR SERPLBLD CKD-EPI 2021: 53 ML/MIN/1.73M2
EOSINOPHIL # BLD AUTO: 260 CELLS/UL (ref 15–500)
EOSINOPHIL NFR BLD AUTO: 2.8 %
ERYTHROCYTE [DISTWIDTH] IN BLOOD BY AUTOMATED COUNT: 11.9 % (ref 11–15)
GLUCOSE SERPL-MCNC: 94 MG/DL (ref 65–99)
HCT VFR BLD AUTO: 32.9 % (ref 35–45)
HGB BLD-MCNC: 10 G/DL (ref 11.7–15.5)
LYMPHOCYTES # BLD AUTO: 2232 CELLS/UL (ref 850–3900)
LYMPHOCYTES NFR BLD AUTO: 24 %
MCH RBC QN AUTO: 29.5 PG (ref 27–33)
MCHC RBC AUTO-ENTMCNC: 30.4 G/DL (ref 32–36)
MCV RBC AUTO: 97.1 FL (ref 80–100)
MONOCYTES # BLD AUTO: 949 CELLS/UL (ref 200–950)
MONOCYTES NFR BLD AUTO: 10.2 %
NEUTROPHILS # BLD AUTO: 5775 CELLS/UL (ref 1500–7800)
NEUTROPHILS NFR BLD AUTO: 62.1 %
PLATELET # BLD AUTO: 438 THOUSAND/UL (ref 140–400)
PMV BLD REES-ECKER: 10.2 FL (ref 7.5–12.5)
POTASSIUM SERPL-SCNC: 4.8 MMOL/L (ref 3.5–5.3)
RBC # BLD AUTO: 3.39 MILLION/UL (ref 3.8–5.1)
SODIUM SERPL-SCNC: 144 MMOL/L (ref 135–146)
WBC # BLD AUTO: 9.3 THOUSAND/UL (ref 3.8–10.8)

## 2025-09-05 ENCOUNTER — APPOINTMENT (OUTPATIENT)
Dept: PRIMARY CARE | Facility: CLINIC | Age: 80
End: 2025-09-05
Payer: MEDICARE

## 2025-10-15 ENCOUNTER — APPOINTMENT (OUTPATIENT)
Dept: PRIMARY CARE | Facility: CLINIC | Age: 80
End: 2025-10-15
Payer: MEDICARE

## 2025-11-11 ENCOUNTER — APPOINTMENT (OUTPATIENT)
Dept: ALLERGY | Facility: CLINIC | Age: 80
End: 2025-11-11
Payer: MEDICARE

## 2026-07-09 ENCOUNTER — APPOINTMENT (OUTPATIENT)
Dept: CARDIOLOGY | Facility: CLINIC | Age: 81
End: 2026-07-09
Payer: MEDICARE